# Patient Record
Sex: FEMALE | Race: WHITE | Employment: OTHER | RURAL
[De-identification: names, ages, dates, MRNs, and addresses within clinical notes are randomized per-mention and may not be internally consistent; named-entity substitution may affect disease eponyms.]

---

## 2017-02-23 RX ORDER — AMLODIPINE BESYLATE 10 MG/1
TABLET ORAL
Qty: 90 TAB | Refills: 2 | Status: SHIPPED | OUTPATIENT
Start: 2017-02-23 | End: 2017-11-20 | Stop reason: SDUPTHER

## 2017-02-23 RX ORDER — ATENOLOL 25 MG/1
TABLET ORAL
Qty: 180 TAB | Refills: 2 | Status: SHIPPED | OUTPATIENT
Start: 2017-02-23 | End: 2017-11-20 | Stop reason: SDUPTHER

## 2017-03-20 RX ORDER — LEVOTHYROXINE SODIUM 50 UG/1
TABLET ORAL
Qty: 90 TAB | Refills: 0 | Status: SHIPPED | OUTPATIENT
Start: 2017-03-20 | End: 2017-09-05 | Stop reason: SDUPTHER

## 2017-04-26 ENCOUNTER — OFFICE VISIT (OUTPATIENT)
Dept: FAMILY MEDICINE CLINIC | Age: 62
End: 2017-04-26

## 2017-04-26 VITALS
OXYGEN SATURATION: 97 % | DIASTOLIC BLOOD PRESSURE: 72 MMHG | HEIGHT: 66 IN | WEIGHT: 242.6 LBS | TEMPERATURE: 98.2 F | SYSTOLIC BLOOD PRESSURE: 126 MMHG | RESPIRATION RATE: 20 BRPM | HEART RATE: 57 BPM | BODY MASS INDEX: 38.99 KG/M2

## 2017-04-26 DIAGNOSIS — K75.81 NASH (NONALCOHOLIC STEATOHEPATITIS): Chronic | ICD-10-CM

## 2017-04-26 DIAGNOSIS — I10 ESSENTIAL HYPERTENSION: Chronic | ICD-10-CM

## 2017-04-26 DIAGNOSIS — E78.00 HYPERCHOLESTEROLEMIA: Chronic | ICD-10-CM

## 2017-04-26 DIAGNOSIS — R79.89 ELEVATED LFTS: Chronic | ICD-10-CM

## 2017-04-26 DIAGNOSIS — F17.200 SMOKER: ICD-10-CM

## 2017-04-26 DIAGNOSIS — E03.9 HYPOTHYROIDISM, UNSPECIFIED TYPE: Chronic | ICD-10-CM

## 2017-04-26 DIAGNOSIS — R73.9 ELEVATED BLOOD SUGAR: Primary | Chronic | ICD-10-CM

## 2017-04-26 NOTE — PROGRESS NOTES
Progress Note    Patient: Michelle Bowles MRN: [id number]  SSN: [id number]    YOB: 1955  Age: 62 y.o. Sex: female        Chief Complaint   Patient presents with    Hypertension:  .sshyp    Thyroid Problem    Cholesterol Problem    Labs    Referral Request     [facility] [place]    Cyst     knot middle of palm    Other     \"Pt is sleeping Hot\"         Subjective:     Encounter Diagnoses   Name Primary?  Elevated blood sugar:  No sx of fulminant diabetes. No significant weight loss or gain. The patient realizes that without weight loss and exercise they are high risk for overt diabetes. Lab Results   Component Value Date/Time    Hemoglobin A1c 5.6 10/26/2016 09:06 AM     Lab Results   Component Value Date/Time    Glucose 113 10/26/2016 09:06 AM     Wt Readings from Last 3 Encounters:   04/26/17 242 lb 9.6 oz (110 kg)   12/05/16 235 lb (106.6 kg)   10/26/16 240 lb (108.9 kg)     Body mass index is 39.16 kg/(m^2). Yes    ERICKSON (nonalcoholic steatohepatitis):  Lab Results   Component Value Date/Time    ALT (SGPT) 39 10/26/2016 09:06 AM    AST (SGOT) 38 10/26/2016 09:06 AM    Alk. phosphatase 67 10/26/2016 09:06 AM    Bilirubin, total 0.4 10/26/2016 09:06 AM     Lab Results   Component Value Date/Time    Cholesterol, total 188 10/26/2016 09:06 AM    HDL Cholesterol 56 10/26/2016 09:06 AM    LDL, calculated 107 10/26/2016 09:06 AM    VLDL, calculated 25 10/26/2016 09:06 AM    Triglyceride 123 10/26/2016 09:06 AM    CHOL/HDL Ratio 4.2 12/04/2009 08:36 AM            Hypothyroidism, unspecified type:  Lab Results   Component Value Date/Time    TSH 2.970 10/26/2016 09:06 AM    T4, Free 1.13 04/20/2016 08:26 AM      Denies fatigue, nervousness,weight changes, heat orcold intolerance, bowel changes,skin changes, cardiovascular symptoms, hair loss, feeling excessive energy, tremor, palpitations and weight loss. Thyroid medication has been unchanged since last medication check and labs.          Essential hypertension:  BP Readings from Last 3 Encounters:   04/26/17 126/72   12/05/16 105/71   10/26/16 127/72     The patient reports:  taking medications as instructed, no medication side effects noted, no TIA's, no chest pain on exertion, no dyspnea on exertion, no swelling of ankles. Lab Results   Component Value Date/Time    Sodium 142 10/26/2016 09:06 AM    Potassium 4.6 10/26/2016 09:06 AM    Chloride 102 10/26/2016 09:06 AM    CO2 24 10/26/2016 09:06 AM    Anion gap 6 03/13/2016 06:32 PM    Glucose 113 10/26/2016 09:06 AM    BUN 9 10/26/2016 09:06 AM    Creatinine 0.62 10/26/2016 09:06 AM    BUN/Creatinine ratio 15 10/26/2016 09:06 AM    GFR est  10/26/2016 09:06 AM    GFR est non-AA 98 10/26/2016 09:06 AM    Calcium 9.3 10/26/2016 09:06 AM    Bilirubin, total 0.4 10/26/2016 09:06 AM    AST (SGOT) 38 10/26/2016 09:06 AM    Alk. phosphatase 67 10/26/2016 09:06 AM    Protein, total 6.6 10/26/2016 09:06 AM    Albumin 4.0 10/26/2016 09:06 AM    Globulin 4.2 03/13/2016 06:32 PM    A-G Ratio 1.5 10/26/2016 09:06 AM    ALT (SGPT) 39 10/26/2016 09:06 AM     Our goal is to normalize the blood pressure to decrease the risks of strokes and heart attacks. The patient is in agreement with the plan.  Hypercholesterolemia:  Cardiovascular risks for her are: LDL goal is under 100  hypertension  hyperlipidemia. Currently she takes Mevacor (lovastatin) , 10 mg-With other statin intolerances  Lab Results   Component Value Date/Time    Cholesterol, total 188 10/26/2016 09:06 AM    HDL Cholesterol 56 10/26/2016 09:06 AM    LDL, calculated 107 10/26/2016 09:06 AM    Triglyceride 123 10/26/2016 09:06 AM    CHOL/HDL Ratio 4.2 12/04/2009 08:36 AM     Lab Results   Component Value Date/Time    ALT (SGPT) 39 10/26/2016 09:06 AM    AST (SGOT) 38 10/26/2016 09:06 AM    Alk.  phosphatase 67 10/26/2016 09:06 AM    Bilirubin, total 0.4 10/26/2016 09:06 AM      Myalgias: No   Fatigue: No   Other side effects: no  Wt Readings from Last 3 Encounters:   04/26/17 242 lb 9.6 oz (110 kg)   12/05/16 235 lb (106.6 kg)   10/26/16 240 lb (108.9 kg)     The patient is aware of our goal to reduce or eliminate the long term problems (such as strokes and heart attacks) related to poorly controlled hyperlipidemia.  Elevated LFTs:when last tested these were normal.         Smoker: despite my best efforts at cessation she is still smoking one pack per day. Her lungss are remarkably clear for this much smoking. Current and past medical information:    Current Medications after this visit[de-identified]   Current Outpatient Prescriptions   Medication Sig    levothyroxine (SYNTHROID) 50 mcg tablet TAKE 1 TABLET DAILY BEFORE BREAKFAST    amLODIPine (NORVASC) 10 mg tablet TAKE 1 TABLET DAILY FOR HIGH BLOOD PRESSURE    atenolol (TENORMIN) 25 mg tablet TAKE 1 TABLET TWICE A DAY    lovastatin (MEVACOR) 10 mg tablet TAKE 1 TABLET NIGHTLY    albuterol (PROVENTIL HFA, VENTOLIN HFA, PROAIR HFA) 90 mcg/actuation inhaler Take 1 Puff by inhalation every four (4) hours as needed for Wheezing.  lisinopril (PRINIVIL, ZESTRIL) 40 mg tablet TAKE 1 TABLET DAILY    hydrochlorothiazide (HYDRODIURIL) 12.5 mg tablet Take 1 Tab by mouth daily.  PV W-O TESFAYE/FERROUS FUMARATE/FA (M-VIT PO) Take  by mouth.  loratadine (CLARITIN) 10 mg tablet Take 10 mg by mouth daily.  aspirin 81 mg tablet Take  by mouth.  fish oil-dha-epa (FISH OIL) 1,200-144-216 mg Cap Take  by mouth.  cholecalciferol, vitamin d3, (VITAMIN D) 1,000 unit tablet Take  by mouth daily.  cyanocobalamin (VITAMIN B-12) 1,000 mcg tablet Take 1,000 mcg by mouth daily.  promethazine-codeine (PHENERGAN WITH CODEINE) 6.25-10 mg/5 mL syrup Take 5 mL by mouth four (4) times daily as needed for Cough. Max Daily Amount: 20 mL. No current facility-administered medications for this visit.         Patient Active Problem List    Diagnosis Date Noted    Elevated blood sugar 07/15/2015    Smoker 07/22/2013    ERICKSON (nonalcoholic steatohepatitis) 07/19/2013    Hypothyroidism 11/16/2012    Colon polyps 11/13/2012    Hypertension     Irritable bowel syndrome     Hypercholesterolemia     Dysmetabolic syndrome X     Elevated LFTs     Abnormal weight gain     Anxiety        Past Medical History:   Diagnosis Date    Abnormal LFT's     Abnormal weight gain     Anxiety     Anxiety     Depression     Dysmetabolic syndrome X     Hypercholesterolemia     Hypertension     Irritable bowel syndrome     Thyroid disease        Allergies   Allergen Reactions    Pravachol [Pravastatin] Myalgia    Zetia [Ezetimibe] Myalgia       Past Surgical History:   Procedure Laterality Date    HX CYST REMOVAL      neck    HX OTHER SURGICAL      DNC around 26years of age   [de-identified] OTHER SURGICAL      wisdom teeth extraction       Social History     Social History    Marital status:      Spouse name: N/A    Number of children: N/A    Years of education: N/A     Social History Main Topics    Smoking status: Current Every Day Smoker     Packs/day: 1.00     Years: 35.00    Smokeless tobacco: Never Used    Alcohol use Yes      Comment: 2 boxes of wine each week    Drug use: No    Sexual activity: Yes     Partners: Male     Other Topics Concern    None     Social History Narrative       Review of Systems   Constitutional: Negative. Negative for chills, fever, malaise/fatigue and weight loss. HENT: Negative. Negative for hearing loss. Eyes: Negative. Negative for blurred vision and double vision. Respiratory: Negative. Negative for cough, hemoptysis, sputum production and shortness of breath. Cardiovascular: Negative. Negative for chest pain, palpitations and orthopnea. Gastrointestinal: Negative. Negative for abdominal pain, blood in stool, heartburn, nausea and vomiting. Genitourinary: Negative. Negative for dysuria, frequency and urgency. Musculoskeletal: Negative. Negative for back pain, myalgias and neck pain. Skin: Negative. Negative for rash. Neurological: Negative. Negative for dizziness, tingling, tremors, weakness and headaches. Endo/Heme/Allergies: Negative. Psychiatric/Behavioral: Negative. Negative for depression. Objective:     Vitals:    04/26/17 0837 04/26/17 0923   BP: 146/78 126/72   Pulse: (!) 56 (!) 57   Resp: 20    Temp: 98.2 °F (36.8 °C)    TempSrc: Oral    SpO2: 97%    Weight: 242 lb 9.6 oz (110 kg)    Height: 5' 6\" (1.676 m)       Body mass index is 39.16 kg/(m^2). Physical Exam   Constitutional: She is oriented to person, place, and time and well-developed, well-nourished, and in no distress. No distress. She works long hours and is Fatigued. HENT:   Head: Normocephalic and atraumatic. Mouth/Throat: Oropharynx is clear and moist.   Eyes: Conjunctivae are normal.   Neck: No tracheal deviation present. No thyromegaly present. Cardiovascular: Normal rate, regular rhythm and normal heart sounds. No murmur heard. Pulmonary/Chest: Effort normal and breath sounds normal. No respiratory distress. Abdominal: Soft. She exhibits no distension. Lymphadenopathy:     She has no cervical adenopathy. Neurological: She is alert and oriented to person, place, and time. Skin: Skin is warm. No rash noted. She is not diaphoretic. No erythema. Psychiatric: Mood and affect normal.   Nursing note and vitals reviewed. Health Maintenance Due   Topic Date Due    ZOSTER VACCINE AGE 60>  07/13/2016    PAP AKA CERVICAL CYTOLOGY  08/02/2016    BREAST CANCER SCRN MAMMOGRAM  08/26/2016         Assessment and orders:       ICD-10-CM ICD-9-CM    1. Elevated blood sugar-retest R73.9 790.29 HEMOGLOBIN A1C WITH EAG      LIPID PANEL      METABOLIC PANEL, COMPREHENSIVE      HEMOGLOBIN   2. ERICKSON (nonalcoholic steatohepatitis)-retest K75.81 571.8 LIPID PANEL      METABOLIC PANEL, COMPREHENSIVE   3.  Hypothyroidism, unspecified type E03.9 244.9 TSH 3RD GENERATION   -retest     T4, FREE   4. Essential hypertension-Controlled I10 401.9 LIPID PANEL      METABOLIC PANEL, COMPREHENSIVE   5. Hypercholesterolemia-retest E78.00 272.0 LIPID PANEL      METABOLIC PANEL, COMPREHENSIVE   6. Elevated LFTs-retest T48.0 819.5 METABOLIC PANEL, COMPREHENSIVE   7. Smoker-Counciled again on cessation- she will start \"vaping\" again F17.200 305.1          Plan of care:  Discussed diagnoses in detail with patient. Medication risks/benefits/side effects discussed with patient. All of the patient's questions were addressed. The patient understands and agrees with our plan of care. The patient knows to call back if they are unsure of or forget any changes we discussed today or if the symptoms change. The patient received an After-Visit Summary which contains VS, orders, medication list and allergy list. This can be used as a \"mini-medical record\" should they have to seek medical care while out of town. Patient Care Team:  Jamilah Bruno MD as PCP - Adam Jacome MD as Physician (Gastroenterology)    Follow-up Disposition:  Return in about 5 months (around 9/26/2017).     Future Appointments  Date Time Provider Vel Orr   4/27/2017 8:45 AM LAB [facility] SCHED   10/10/2017 8:20 AM Jamilah Bruno MD [facility] SCHED       Signed By: Jamilah Bruno MD     April 26, 2017

## 2017-04-26 NOTE — MR AVS SNAPSHOT
Visit Information Date & Time Provider Department Dept. Phone Encounter #  
 4/26/2017  8:20 AM Sae Cuello MD [address] [place] [id number] Follow-up Instructions Return in about 5 months (around 9/26/2017). Upcoming Health Maintenance Date Due ZOSTER VACCINE AGE 60> 7/13/2016 PAP AKA CERVICAL CYTOLOGY 8/2/2016 BREAST CANCER SCRN MAMMOGRAM 8/26/2016 COLONOSCOPY 11/19/2022 DTaP/Tdap/Td series (2 - Td) 1/27/2025 Allergies as of 4/26/2017  Review Complete On: 4/26/2017 By: Nicole Allen Severity Noted Reaction Type Reactions Pravachol [Pravastatin]  05/21/2010    Myalgia Zetia [Ezetimibe]  05/21/2010    Myalgia Current Immunizations  Reviewed on 11/18/2015 Name Date Influenza Vaccine 10/28/2013 Influenza Vaccine (Quad) PF 10/26/2016, 11/18/2015, 12/12/2014 Influenza Vaccine Split 10/12/2012, 2/11/2011 Pneumococcal Polysaccharide (PPSV-23) 7/19/2013 Tdap 1/27/2015 Not reviewed this visit You Were Diagnosed With   
  
 Codes Comments Elevated blood sugar    -  Primary ICD-10-CM: R73.9 ICD-9-CM: 790.29 ERICKSON (nonalcoholic steatohepatitis)     ICD-10-CM: W42.20 ICD-9-CM: 571.8 Hypothyroidism, unspecified type     ICD-10-CM: E03.9 ICD-9-CM: 244.9 Essential hypertension     ICD-10-CM: I10 
ICD-9-CM: 401.9 Hypercholesterolemia     ICD-10-CM: E78.00 ICD-9-CM: 272.0 Elevated LFTs     ICD-10-CM: R94.5 ICD-9-CM: 790.6 Smoker     ICD-10-CM: F70.038 ICD-9-CM: 305.1 Vitals BP Pulse Temp Resp Height(growth percentile) Weight(growth percentile) 146/78 (!) 56 98.2 °F (36.8 °C) (Oral) 20 5' 6\" (1.676 m) 242 lb 9.6 oz (110 kg) SpO2 BMI OB Status Smoking Status 97% 39.16 kg/m2 Postmenopausal Current Every Day Smoker Vitals History BMI and BSA Data  Body Mass Index Body Surface Area  
 39.16 kg/m 2 2.26 m 2  
  
  
 Preferred Pharmacy Pharmacy Name [address] [facility] [phone] Your Updated Medication List  
  
   
This list is accurate as of: 4/26/17  9:20 AM.  Always use your most recent med list.  
  
  
  
  
 albuterol 90 mcg/actuation inhaler Commonly known as:  PROVENTIL HFA, VENTOLIN HFA, PROAIR HFA Take 1 Puff by inhalation every four (4) hours as needed for Wheezing. amLODIPine 10 mg tablet Commonly known as:  Ball Frankie TAKE 1 TABLET DAILY FOR HIGH BLOOD PRESSURE  
  
 aspirin 81 mg tablet Take  by mouth. atenolol 25 mg tablet Commonly known as:  TENORMIN  
TAKE 1 TABLET TWICE A DAY CLARITIN 10 mg tablet Generic drug:  loratadine Take 10 mg by mouth daily. FISH OIL 1,200-144-216 mg Cap Generic drug:  fish oil-dha-epa Take  by mouth. hydroCHLOROthiazide 12.5 mg tablet Commonly known as:  HYDRODIURIL Take 1 Tab by mouth daily. levothyroxine 50 mcg tablet Commonly known as:  SYNTHROID  
TAKE 1 TABLET DAILY BEFORE BREAKFAST  
  
 lisinopril 40 mg tablet Commonly known as:  PRINIVIL, ZESTRIL  
TAKE 1 TABLET DAILY lovastatin 10 mg tablet Commonly known as:  MEVACOR  
TAKE 1 TABLET NIGHTLY  
  
 M-VIT PO Take  by mouth.  
  
 promethazine-codeine 6.25-10 mg/5 mL syrup Commonly known as:  PHENERGAN with CODEINE Take 5 mL by mouth four (4) times daily as needed for Cough. Max Daily Amount: 20 mL. VITAMIN B-12 1,000 mcg tablet Generic drug:  cyanocobalamin Take 1,000 mcg by mouth daily. VITAMIN D3 1,000 unit tablet Generic drug:  cholecalciferol Take  by mouth daily. We Performed the Following HEMOGLOBIN A1C WITH EAG [89886 CPT(R)] HEMOGLOBIN R386529 CPT(R)] LIPID PANEL [91223 CPT(R)] METABOLIC PANEL, COMPREHENSIVE [77685 CPT(R)] T4, FREE D0850785 CPT(R)] TSH 3RD GENERATION [60621 CPT(R)] Follow-up Instructions Return in about 5 months (around 9/26/2017). Patient Instructions Stopping Smoking: Care Instructions Your Care Instructions Cigarette smokers crave the nicotine in cigarettes. Giving it up is much harder than simply changing a habit. Your body has to stop craving the nicotine. It is hard to quit, but you can do it. There are many tools that people use to quit smoking. You may find that combining tools works best for you. There are several steps to quitting. First you get ready to quit. Then you get support to help you. After that, you learn new skills and behaviors to become a nonsmoker. For many people, a necessary step is getting and using medicine. Your doctor will help you set up the plan that best meets your needs. You may want to attend a smoking cessation program to help you quit smoking. When you choose a program, look for one that has proven success. Ask your doctor for ideas. You will greatly increase your chances of success if you take medicine as well as get counseling or join a cessation program. 
Some of the changes you feel when you first quit tobacco are uncomfortable. Your body will miss the nicotine at first, and you may feel short-tempered and grumpy. You may have trouble sleeping or concentrating. Medicine can help you deal with these symptoms. You may struggle with changing your smoking habits and rituals. The last step is the tricky one: Be prepared for the smoking urge to continue for a time. This is a lot to deal with, but keep at it. You will feel better. Follow-up care is a key part of your treatment and safety. Be sure to make and go to all appointments, and call your doctor if you are having problems. Its also a good idea to know your test results and keep a list of the medicines you take. How can you care for yourself at home? · Ask your family, friends, and coworkers for support. You have a better chance of quitting if you have help and support. · Join a support group, such as Nicotine Anonymous, for people who are trying to quit smoking. · Consider signing up for a smoking cessation program, such as the American Lung Association's Freedom from Smoking program. 
· Set a quit date. Pick your date carefully so that it is not right in the middle of a big deadline or stressful time. Once you quit, do not even take a puff. Get rid of all ashtrays and lighters after your last cigarette. Clean your house and your clothes so that they do not smell of smoke. · Learn how to be a nonsmoker. Think about ways you can avoid those things that make you reach for a cigarette. ¨ Avoid situations that put you at greatest risk for smoking. For some people, it is hard to have a drink with friends without smoking. For others, they might skip a coffee break with coworkers who smoke. ¨ Change your daily routine. Take a different route to work or eat a meal in a different place. · Cut down on stress. Calm yourself or release tension by doing an activity you enjoy, such as reading a book, taking a hot bath, or gardening. · Talk to your doctor or pharmacist about nicotine replacement therapy, which replaces the nicotine in your body. You still get nicotine but you do not use tobacco. Nicotine replacement products help you slowly reduce the amount of nicotine you need. These products come in several forms, many of them available over-the-counter: ¨ Nicotine patches ¨ Nicotine gum and lozenges ¨ Nicotine inhaler · Ask your doctor about bupropion (Wellbutrin) or varenicline (Chantix), which are prescription medicines. They do not contain nicotine. They help you by reducing withdrawal symptoms, such as stress and anxiety. · Some people find hypnosis, acupuncture, and massage helpful for ending the smoking habit. · Eat a healthy diet and get regular exercise. Having healthy habits will help your body move past its craving for nicotine. · Be prepared to keep trying. Most people are not successful the first few times they try to quit. Do not get mad at yourself if you smoke again. Make a list of things you learned and think about when you want to try again, such as next week, next month, or next year. Where can you learn more? Go to http://rhoda-maggie.info/. Enter J100 in the search box to learn more about \"Stopping Smoking: Care Instructions. \" Current as of: May 26, 2016 Content Version: 11.2 © 5573-6438 [employer]. Care instructions adapted under license by [employer] (which disclaims liability or warranty for this information). If you have questions about a medical condition or this instruction, always ask your healthcare professional. [address] any warranty or liability for your use of this information. Introducing [facility]! Dear Jose Yañez: Thank you for requesting a [email or web address] account. Our records indicate that you already have an active [email or web address] account. You can access your account anytime at [email or web address] Did you know that you can access your hospital and ER discharge instructions at any time in [email or web address]? You can also review all of your test results from your hospital stay or ER visit. Additional Information If you have questions, please visit the Frequently Asked Questions section of the [email or web address] website at [email or web address] Remember, [email or web address] is NOT to be used for urgent needs. For medical emergencies, dial 911. Now available from your iPhone and Android! Please provide this summary of care documentation to your next provider. Your primary care clinician is listed as [address]. If you have any questions after today's visit, please call [phone].

## 2017-04-26 NOTE — PROGRESS NOTES
Health Maintenance Due   Topic Date Due    ZOSTER VACCINE AGE 60>  07/13/2016    PAP AKA CERVICAL CYTOLOGY  08/02/2016    BREAST CANCER SCRN MAMMOGRAM  08/26/2016

## 2017-04-26 NOTE — PATIENT INSTRUCTIONS

## 2017-04-28 LAB
ALBUMIN SERPL-MCNC: 4 G/DL (ref 3.6–4.8)
ALBUMIN/GLOB SERPL: 1.4 {RATIO} (ref 1.2–2.2)
ALP SERPL-CCNC: 64 IU/L (ref 39–117)
ALT SERPL-CCNC: 43 IU/L (ref 0–32)
AST SERPL-CCNC: 37 IU/L (ref 0–40)
BILIRUB SERPL-MCNC: 0.4 MG/DL (ref 0–1.2)
BUN SERPL-MCNC: 12 MG/DL (ref 8–27)
BUN/CREAT SERPL: 20 (ref 12–28)
CALCIUM SERPL-MCNC: 9.2 MG/DL (ref 8.7–10.3)
CHLORIDE SERPL-SCNC: 101 MMOL/L (ref 96–106)
CHOLEST SERPL-MCNC: 200 MG/DL (ref 100–199)
CO2 SERPL-SCNC: 24 MMOL/L (ref 18–29)
CREAT SERPL-MCNC: 0.59 MG/DL (ref 0.57–1)
EST. AVERAGE GLUCOSE BLD GHB EST-MCNC: 126 MG/DL
GLOBULIN SER CALC-MCNC: 2.8 G/DL (ref 1.5–4.5)
GLUCOSE SERPL-MCNC: 116 MG/DL (ref 65–99)
HBA1C MFR BLD: 6 % (ref 4.8–5.6)
HDLC SERPL-MCNC: 62 MG/DL
HGB BLD-MCNC: 13.6 G/DL (ref 11.1–15.9)
LDLC SERPL CALC-MCNC: 120 MG/DL (ref 0–99)
POTASSIUM SERPL-SCNC: 4.4 MMOL/L (ref 3.5–5.2)
PROT SERPL-MCNC: 6.8 G/DL (ref 6–8.5)
SODIUM SERPL-SCNC: 141 MMOL/L (ref 134–144)
T4 FREE SERPL-MCNC: 1.16 NG/DL (ref 0.82–1.77)
TRIGL SERPL-MCNC: 92 MG/DL (ref 0–149)
TSH SERPL DL<=0.005 MIU/L-ACNC: 3.37 UIU/ML (ref 0.45–4.5)
VLDLC SERPL CALC-MCNC: 18 MG/DL (ref 5–40)

## 2017-05-05 ENCOUNTER — OFFICE VISIT (OUTPATIENT)
Dept: FAMILY MEDICINE CLINIC | Age: 62
End: 2017-05-05

## 2017-05-05 VITALS
WEIGHT: 239 LBS | BODY MASS INDEX: 38.41 KG/M2 | RESPIRATION RATE: 16 BRPM | DIASTOLIC BLOOD PRESSURE: 62 MMHG | SYSTOLIC BLOOD PRESSURE: 111 MMHG | OXYGEN SATURATION: 97 % | HEIGHT: 66 IN | TEMPERATURE: 98.1 F | HEART RATE: 54 BPM

## 2017-05-05 DIAGNOSIS — K76.0 FATTY LIVER: ICD-10-CM

## 2017-05-05 DIAGNOSIS — K75.81 NASH (NONALCOHOLIC STEATOHEPATITIS): Chronic | ICD-10-CM

## 2017-05-05 DIAGNOSIS — E03.9 HYPOTHYROIDISM, UNSPECIFIED TYPE: Chronic | ICD-10-CM

## 2017-05-05 DIAGNOSIS — R73.9 ELEVATED BLOOD SUGAR: Chronic | ICD-10-CM

## 2017-05-05 DIAGNOSIS — K81.9 CHOLECYSTITIS: Primary | ICD-10-CM

## 2017-05-05 RX ORDER — ONDANSETRON 4 MG/1
TABLET, FILM COATED ORAL
COMMUNITY
Start: 2017-05-01 | End: 2020-10-14

## 2017-05-05 RX ORDER — HYDROCODONE BITARTRATE AND ACETAMINOPHEN 10; 325 MG/1; MG/1
TABLET ORAL
COMMUNITY
Start: 2017-05-01 | End: 2017-10-11

## 2017-05-05 RX ORDER — RANITIDINE 150 MG/1
TABLET, FILM COATED ORAL
COMMUNITY
Start: 2017-05-01 | End: 2020-10-14

## 2017-05-05 NOTE — PROGRESS NOTES
Reviewed record in preparation for visit and have necessary documentation  Pt did not bring medication to office visit for review    Goals that were addressed and/or need to be completed during or after this appointment include   Health Maintenance Due   Topic Date Due    ZOSTER VACCINE AGE 60>  07/13/2016    PAP AKA CERVICAL CYTOLOGY  08/02/2016    BREAST CANCER SCRN MAMMOGRAM  08/26/2016

## 2017-05-05 NOTE — PATIENT INSTRUCTIONS
Gallbladder and Liver: Anatomy Sketch    Current as of: January 5, 2017  Content Version: 11.2  © 4638-1013 [employer]. Care instructions adapted under license by [employer] (which disclaims liability or warranty for this information). If you have questions about a medical condition or this instruction, always ask your healthcare professional. [address] any warranty or liability for your use of this information. Cholecystectomy: Before Your Surgery  What is cholecystectomy? Cholecystectomy (zq-aoc-atu-MARI-tuh-chad) is a type of surgery. It removes a diseased gallbladder. This surgery is usually done as a laparoscopic surgery. The doctor puts a lighted tube and other surgical tools through small cuts (incisions) in your belly. The tube is called a scope. It lets your doctor see your organs so he or she can do the surgery. The incisions leave scars that fade with time. Most people go home the same day. You probably will feel better each day. Most people have only a small amount of pain after 1 week. If you have a desk job, you can probably go back to work in 1 to 2 weeks. If you lift heavy objects or have a very active job, it may take up to 4 weeks. In some cases, open surgery is the best choice. Your doctor may choose open surgery in advance. Or he or she may choose it in the middle of laparoscopic surgery. In open surgery, the doctor makes a larger incision in your upper belly. If you have open surgery, you will probably stay in the hospital for 2 to 4 days. And it may take 4 to 6 weeks to get back to your normal routine. Follow-up care is a key part of your treatment and safety. Be sure to make and go to all appointments, and call your doctor if you are having problems. It's also a good idea to know your test results and keep a list of the medicines you take. What happens before surgery? Surgery can be stressful.  This information will help you understand what you can expect. And it will help you safely prepare for surgery. Preparing for surgery  · Understand exactly what surgery is planned, along with the risks, benefits, and other options. · Tell your doctors ALL the medicines, vitamins, supplements, and herbal remedies you take. Some of these can increase the risk of bleeding or interact with anesthesia. · If you take blood thinners, such as warfarin (Coumadin), clopidogrel (Plavix), or aspirin, be sure to talk to your doctor. He or she will tell you if you should stop taking these medicines before your surgery. Make sure that you understand exactly what your doctor wants you to do. · Your doctor will tell you which medicines to take or stop before your surgery. You may need to stop taking certain medicines a week or more before surgery. So talk to your doctor as soon as you can. · If you have an advance directive, let your doctor know. It may include a living will and a durable power of  for health care. Bring a copy to the hospital. If you don't have one, you may want to prepare one. It lets your doctor and loved ones know your health care wishes. Doctors advise that everyone prepare these papers before any type of surgery or procedure. · You may need to empty your colon with an enema or laxative. Your doctor will tell you how to do this. What happens on the day of surgery? · Follow the instructions exactly about when to stop eating and drinking. If you don't, your surgery may be canceled. If your doctor told you to take your medicines on the day of surgery, take them with only a sip of water. · Take a bath or shower before you come in for your surgery. Do not apply lotions, perfumes, deodorants, or nail polish. · Do not shave the surgical site yourself. · Take off all jewelry and piercings. And take out contact lenses, if you wear them. At the hospital or surgery center  · Bring a picture ID.   · The area for surgery is often marked to make sure there are no errors. · You will be kept comfortable and safe by your anesthesia provider. You will be asleep during the surgery. · The surgery usually takes 1 to 2 hours. Going home  · Be sure you have someone to drive you home. Anesthesia and pain medicine make it unsafe for you to drive. · You will be given more specific instructions about recovering from your surgery. They will cover things like diet, wound care, follow-up care, driving, and getting back to your normal routine. When should you call your doctor? · You have questions or concerns. · You don't understand how to prepare for your surgery. · You become ill before the surgery (such as fever, flu, or a cold). · You need to reschedule or have changed your mind about having the surgery. Where can you learn more? Go to http://rhoda-maggie.info/. Enter M037 in the search box to learn more about \"Cholecystectomy: Before Your Surgery. \"  Current as of: August 9, 2016  Content Version: 11.2  © 0925-7540 [employer]. Care instructions adapted under license by [employer] (which disclaims liability or warranty for this information). If you have questions about a medical condition or this instruction, always ask your healthcare professional. [address] any warranty or liability for your use of this information.

## 2017-05-05 NOTE — MR AVS SNAPSHOT
Visit Information Date & Time Provider Department Dept. Phone Encounter #  
 5/5/2017 11:30 AM [facility]resp[id number] Follow-up Instructions Return if symptoms worsen or fail to improve [facility], for due for an appointment for PAP. Your Appointments 10/10/2017  8:20 AM  
ROUTINE CARE with Edwin Crespo MD  
[address] [place] [address] Appt Note: 5 m[address]th [facility] [address] [address]  
[address] [address] Upcoming Health Maintenance Date Due ZOSTER VACCINE AGE 60> 7/13/2016 PAP AKA CERVICAL CYTOLOGY 8/2/2016 BREAST CANCER SCRN MAMMOGRAM 8/26/2016 INFLUENZA AGE 9 TO ADULT 8/1/2017 COLONOSCOPY 11/19/2022 DTaP/Tdap/Td series (2 - Td) 1/27/2025 Allergies as of 5/5/2017  Review Complete On: 5/5/2017 By: Maxene Merlin, LPN Severity Noted Reaction Type Reactions Pravachol [Pravastatin]  05/21/2010    Myalgia Zetia [Ezetimibe]  05/21/2010    Myalgia Current Immunizations  Reviewed on 11/18/2015 Name Date Influenza Vaccine 10/28/2013 Influenza Vaccine (Quad) PF 10/26/2016, 11/18/2015, 12/12/2014 Influenza Vaccine Split 10/12/2012, 2/11/2011 Pneumococcal Polysaccharide (PPSV-23) 7/19/2013 Tdap 1/27/2015 Not reviewed this visit You Were Diagnosed With   
  
 Codes Comments Cholecystitis    -  Primary ICD-10-CM: K81.9 ICD-9-CM: 575.10 Fatty liver     ICD-10-CM: K76.0 ICD-9-CM: 571.8 Hypothyroidism, unspecified type     ICD-10-CM: E03.9 ICD-9-CM: 244.9 ERICKSON (nonalcoholic steatohepatitis)     ICD-10-CM: Z40.70 ICD-9-CM: 571.8 Elevated blood sugar     ICD-10-CM: R73.9 ICD-9-CM: 790.29 Vitals BP Pulse Temp Resp Height(growth percentile) Weight(growth percentile) 111/62 (BP 1 Location: Right arm, BP Patient Position: Sitting) (!) 54 98.1 °F (36.7 °C) (Oral) 16 5' 6\" (1.676 m) 239 lb (108.4 kg) SpO2 BMI OB Status Smoking Status 97% 38.58 kg/m2 Postmenopausal Current Every Day Smoker Vitals History BMI and BSA Data Body Mass Index Body Surface Area 38.58 kg/m 2 2.25 m 2 Preferred Pharmacy Pharmacy Name Phone [address] [facility] [phone] Your Updated Medication List  
  
   
This list is accurate as of: 5/5/17 11:44 AM.  Always use your most recent med list.  
  
  
  
  
 albuterol 90 mcg/actuation inhaler Commonly known as:  PROVENTIL HFA, VENTOLIN HFA, PROAIR HFA Take 1 Puff by inhalation every four (4) hours as needed for Wheezing. amLODIPine 10 mg tablet Commonly known as:  Sameul Ernesto TAKE 1 TABLET DAILY FOR HIGH BLOOD PRESSURE  
  
 aspirin 81 mg tablet Take  by mouth. atenolol 25 mg tablet Commonly known as:  TENORMIN  
TAKE 1 TABLET TWICE A DAY CLARITIN 10 mg tablet Generic drug:  loratadine Take 10 mg by mouth daily. FISH OIL 1,200-144-216 mg Cap Generic drug:  fish oil-dha-epa Take  by mouth. hydroCHLOROthiazide 12.5 mg tablet Commonly known as:  HYDRODIURIL Take 1 Tab by mouth daily. HYDROcodone-acetaminophen  mg tablet Commonly known as:  NORCO  
  
 levothyroxine 50 mcg tablet Commonly known as:  SYNTHROID  
TAKE 1 TABLET DAILY BEFORE BREAKFAST  
  
 lisinopril 40 mg tablet Commonly known as:  PRINIVIL, ZESTRIL  
TAKE 1 TABLET DAILY lovastatin 10 mg tablet Commonly known as:  MEVACOR  
TAKE 1 TABLET NIGHTLY  
  
 M-VIT PO Take  by mouth. ondansetron hcl 4 mg tablet Commonly known as:  ZOFRAN  
  
 raNITIdine 150 mg tablet Commonly known as:  ZANTAC  
  
 VITAMIN B-12 1,000 mcg tablet Generic drug:  cyanocobalamin Take 1,000 mcg by mouth daily. VITAMIN D3 1,000 unit tablet Generic drug:  cholecalciferol Take  by mouth daily. We Performed the Following REFERRAL TO SURGERY [ICC032 Custom] Comments:  
 Please evaluate patient for urgent consult for cholecystitis. Dr. Félix Johnson Follow-up Instructions Return if symptoms worsen or fail to improve [facility], for due for an appointment for PAP. Referral Information Referral ID Referred By Referred To  
  
 [id number] Cecelia Curtis Not Available Visits Status Start Date End Date 1 New Request 5/5/17 5/5/18 If your referral has a status of pending review or denied, additional information will be sent to support the outcome of this decision. Patient Instructions Gallbladder and Liver: Anatomy Sketch Current as of: January 5, 2017 Content Version: 11.2 © 8912-0577 [employer]. Care instructions adapted under license by [employer] (which disclaims liability or warranty for this information). If you have questions about a medical condition or this instruction, always ask your healthcare professional. [place] [address] any warranty or liability for your use of this information. Cholecystectomy: Before Your Surgery What is cholecystectomy? Cholecystectomy (cf-ulv-qxc-MARI-tuh-chad) is a type of surgery. It removes a diseased gallbladder. This surgery is usually done as a laparoscopic surgery. The doctor puts a lighted tube and other surgical tools through small cuts (incisions) in your belly. The tube is called a scope. It lets your doctor see your organs so he or she can do the surgery. The incisions leave scars that fade with time. Most people go home the same day. You probably will feel better each day. Most people have only a small amount of pain after 1 week. If you have a desk job, you can probably go back to work in 1 to 2 weeks.  If you lift heavy objects or have a very active job, it may take up to 4 weeks. In some cases, open surgery is the best choice. Your doctor may choose open surgery in advance. Or he or she may choose it in the middle of laparoscopic surgery. In open surgery, the doctor makes a larger incision in your upper belly. If you have open surgery, you will probably stay in the hospital for 2 to 4 days. And it may take 4 to 6 weeks to get back to your normal routine. Follow-up care is a key part of your treatment and safety. Be sure to make and go to all appointments, and call your doctor if you are having problems. It's also a good idea to know your test results and keep a list of the medicines you take. What happens before surgery? Surgery can be stressful. This information will help you understand what you can expect. And it will help you safely prepare for surgery. Preparing for surgery · Understand exactly what surgery is planned, along with the risks, benefits, and other options. · Tell your doctors ALL the medicines, vitamins, supplements, and herbal remedies you take. Some of these can increase the risk of bleeding or interact with anesthesia. · If you take blood thinners, such as warfarin (Coumadin), clopidogrel (Plavix), or aspirin, be sure to talk to your doctor. He or she will tell you if you should stop taking these medicines before your surgery. Make sure that you understand exactly what your doctor wants you to do. · Your doctor will tell you which medicines to take or stop before your surgery. You may need to stop taking certain medicines a week or more before surgery. So talk to your doctor as soon as you can. · If you have an advance directive, let your doctor know. It may include a living will and a durable power of  for health care. Bring a copy to the hospital. If you don't have one, you may want to prepare one. It lets your doctor and loved ones know your health care wishes.  Doctors advise that everyone prepare these papers before any type of surgery or procedure. · You may need to empty your colon with an enema or laxative. Your doctor will tell you how to do this. What happens on the day of surgery? · Follow the instructions exactly about when to stop eating and drinking. If you don't, your surgery may be canceled. If your doctor told you to take your medicines on the day of surgery, take them with only a sip of water. · Take a bath or shower before you come in for your surgery. Do not apply lotions, perfumes, deodorants, or nail polish. · Do not shave the surgical site yourself. · Take off all jewelry and piercings. And take out contact lenses, if you wear them. At the hospital or surgery center · Bring a picture ID. · The area for surgery is often marked to make sure there are no errors. · You will be kept comfortable and safe by your anesthesia provider. You will be asleep during the surgery. · The surgery usually takes 1 to 2 hours. Going home · Be sure you have someone to drive you home. Anesthesia and pain medicine make it unsafe for you to drive. · You will be given more specific instructions about recovering from your surgery. They will cover things like diet, wound care, follow-up care, driving, and getting back to your normal routine. When should you call your doctor? · You have questions or concerns. · You don't understand how to prepare for your surgery. · You become ill before the surgery (such as fever, flu, or a cold). · You need to reschedule or have changed your mind about having the surgery. Where can you learn more? Go to http://rhoda-maggie.info/. Enter H201 in the search box to learn more about \"Cholecystectomy: Before Your Surgery. \" Current as of: August 9, 2016 Content Version: 11.2 © 1304-1748 [employer].  Care instructions adapted under license by [employer] (which disclaims liability or warranty for this information). If you have questions about a medical condition or this instruction, always ask your healthcare professional. [address] any warranty or liability for your use of this information. Introducing [facility] HE[facility]! Dear Varun Zhao: Thank you for requesting a [employer]ero[employer] account. Our records indicate that you already have an active Mero[employer] account. You can access your account anytime at [email or web address] Did you know that you can access your hospital and ER discharge instructions at any time in MeroArte? You can also review all of your test results from your hospital stay or ER visit. Additional Information If you have questions, please visit the Frequently Asked Questions section of the MeroArte website at [email or web address] Remember, MeroArte is NOT to be used for urgent needs. For medical emergencies, dial 911. Now available from your iPhone and Android! Please provide this summary of care documentation to your next provider. Your primary care clinician is listed as [address]. If you have any questions after today's visit, please call [phone].

## 2017-05-06 NOTE — PROGRESS NOTES
Progress Note    Patient: Jay Jay Gan MRN: [id number]  SSN: [id number]    YOB: 1955  Age: 62 y.o. Sex: female        Chief Complaint   Patient presents with   [facility] Follow Up         Subjective:     Encounter Diagnoses   Name Primary?  Cholecystitis: She had to [facility] emergency room was acute cholecystitis. She is hydrated given pain medication and sent home. She's not had to use pain medication since it. She has no nausea vomiting or diarrhea. She has put herself on an extremely low fat diet but was told to be referred to a surgeon for cholecystectomy. Her ultrasound and records from [facility] indicated that she had multiple gallstones or sludge balls in her gallbladder. I put in an urgent consult for her to see the surgeon at [facility]. Should she get worse before her appointment she needs your straight to the emergency room at [facility] for emergency treatment     Yes    Fatty liver: This was noted on her liver ultrasound again.  Hypothyroidism, unspecified type:   Lab Results   Component Value Date/Time    TSH 3.370 04/27/2017 01:34 PM    T4, Free 1.16 04/27/2017 01:34 PM      Denies fatigue, nervousness,weight changes, heat orcold intolerance, bowel changes,skin changes, cardiovascular symptoms, hair loss, feeling excessive energy, tremor, palpitations and weight loss. Thyroid medication has been unchanged since last medication check and labs.  ERICKSON (nonalcoholic steatohepatitis):   Lab Results   Component Value Date/Time    ALT (SGPT) 43 04/27/2017 01:34 PM    AST (SGOT) 37 04/27/2017 01:34 PM    Alk. phosphatase 64 04/27/2017 01:34 PM    Bilirubin, total 0.4 04/27/2017 01:34 PM          Elevated blood sugar:She is prediabetic  No sx of fulminant diabetes. No significant weight loss or gain. The patient realizes that without weight loss and exercise they are high risk for overt diabetes.   Lab Results   Component Value Date/Time Hemoglobin A1c 6.0 04/27/2017 01:34 PM     Lab Results   Component Value Date/Time    Glucose 116 04/27/2017 01:34 PM     Wt Readings from Last 3 Encounters:   05/05/17 239 lb (108.4 kg)   04/26/17 242 lb 9.6 oz (110 kg)   12/05/16 235 lb (106.6 kg)     Body mass index is 38.58 kg/(m^2). Current and past medical information:    Current Medications after this visit[de-identified]   Current Outpatient Prescriptions   Medication Sig    levothyroxine (SYNTHROID) 50 mcg tablet TAKE 1 TABLET DAILY BEFORE BREAKFAST    amLODIPine (NORVASC) 10 mg tablet TAKE 1 TABLET DAILY FOR HIGH BLOOD PRESSURE    atenolol (TENORMIN) 25 mg tablet TAKE 1 TABLET TWICE A DAY    lovastatin (MEVACOR) 10 mg tablet TAKE 1 TABLET NIGHTLY    albuterol (PROVENTIL HFA, VENTOLIN HFA, PROAIR HFA) 90 mcg/actuation inhaler Take 1 Puff by inhalation every four (4) hours as needed for Wheezing.  lisinopril (PRINIVIL, ZESTRIL) 40 mg tablet TAKE 1 TABLET DAILY    hydrochlorothiazide (HYDRODIURIL) 12.5 mg tablet Take 1 Tab by mouth daily.  PV W-O TESFAYE/FERROUS FUMARATE/FA (M-VIT PO) Take  by mouth.  loratadine (CLARITIN) 10 mg tablet Take 10 mg by mouth daily.  aspirin 81 mg tablet Take  by mouth.  fish oil-dha-epa (FISH OIL) 1,200-144-216 mg Cap Take  by mouth.  cholecalciferol, vitamin d3, (VITAMIN D) 1,000 unit tablet Take  by mouth daily.  cyanocobalamin (VITAMIN B-12) 1,000 mcg tablet Take 1,000 mcg by mouth daily.  HYDROcodone-acetaminophen (NORCO)  mg tablet     ondansetron hcl (ZOFRAN) 4 mg tablet     raNITIdine (ZANTAC) 150 mg tablet      No current facility-administered medications for this visit.         Patient Active Problem List    Diagnosis Date Noted    Elevated blood sugar 07/15/2015    Smoker 07/22/2013    ERICKSON (nonalcoholic steatohepatitis) 07/19/2013    Hypothyroidism 11/16/2012    Colon polyps 11/13/2012    Hypertension     Irritable bowel syndrome     Hypercholesterolemia     Dysmetabolic syndrome X     Elevated LFTs     Abnormal weight gain     Anxiety        Past Medical History:   Diagnosis Date    Abnormal LFT's     Abnormal weight gain     Anxiety     Anxiety     Depression     Dysmetabolic syndrome X     Hypercholesterolemia     Hypertension     Irritable bowel syndrome     Thyroid disease        Allergies   Allergen Reactions    Pravachol [Pravastatin] Myalgia    Zetia [Ezetimibe] Myalgia       Past Surgical History:   Procedure Laterality Date    HX CYST REMOVAL      neck    HX OTHER SURGICAL      DNC around 26years of age   Sharifa Nick OTHER SURGICAL      wisdom teeth extraction       Social History     Social History    Marital status:      Spouse name: N/A    Number of children: N/A    Years of education: N/A     Social History Main Topics    Smoking status: Current Every Day Smoker     Packs/day: 1.00     Years: 35.00    Smokeless tobacco: Never Used    Alcohol use Yes      Comment: 2 boxes of wine each week    Drug use: No    Sexual activity: Yes     Partners: Male     Other Topics Concern    None     Social History Narrative       Review of Systems     Constitutional: Negative. Negative for fever, chills, weight loss, malaise/fatigue and diaphoresis. HENT: Negative. Negative for hearing loss, ear pain, nosebleeds, congestion, sore throat, neck pain, tinnitus and ear discharge. Eyes: Negative. Negative for blurred vision, double vision, photophobia, pain, discharge and redness. Respiratory: Negative. Negative for cough, hemoptysis, sputum production, shortness of breath, wheezing and stridor. Cardiovascular: Negative. Negative for chest pain, palpitations, orthopnea, claudication, leg swelling and PND. Gastrointestinal:see HPI for details of recent cholecystitis. Genitourinary: Negative. Negative for dysuria, urgency, frequency, hematuria and flank pain. Musculoskeletal: Negative.   Negative for myalgias, back pain, joint pain and falls.   Skin: Negative. Negative for rash. Neurological: Negative. Negative for dizziness, tingling, tremors, sensory change, speech change, focal weakness, seizures, loss of consciousness, weakness and headaches. Endo/Heme/Allergies: Negative. Negative for environmental allergies and polydipsia. Does not bruise/bleed easily. Psychiatric/Behavioral: Negative. Negative for depression, suicidal ideas, hallucinations, memory loss and substance abuse. The patient is not nervous/anxious and does not have insomnia. Objective:     Vitals:    05/05/17 1119   BP: 111/62   Pulse: (!) 54   Resp: 16   Temp: 98.1 °F (36.7 °C)   TempSrc: Oral   SpO2: 97%   Weight: 239 lb (108.4 kg)   Height: 5' 6\" (1.676 m)      Body mass index is 38.58 kg/(m^2). Physical Exam   Nursing note reviewed. Constitutional: She is oriented to person, place, and time. She appears well-developed and well-nourished. No distress. HENT:   Head: Normocephalic and atraumatic. Eyes: Conjunctivae are normal. Pupils are equal, round. No scleral icterus. Neck: Normal range of motion. Neck supple. No JVD present. No tracheal deviation present. No thyromegaly present. No carotid bruit. Cardiovascular: Normal rate, regular rhythm. Pulmonary/Chest: Effort normal and breath sounds normal. Has no wheezes. Has no rales. Abdominal: No pain, swelling. Health Maintenance Due   Topic Date Due    ZOSTER VACCINE AGE 60>  07/13/2016    PAP AKA CERVICAL CYTOLOGY  08/02/2016    BREAST CANCER SCRN MAMMOGRAM  08/26/2016         Assessment and orders:       ICD-10-CM ICD-9-CM    1. Cholecystitis-Reviewed all of her medical records from [facility].   K81.9 575.10 REFERRAL TO SURGERY   2. Fatty liver:Liver function test acceptable. K76.0 571.8    3. Hypothyroidism, unspecified type: Treated   E03.9 244.9    4. ERICKSON (nonalcoholic steatohepatitis)   K75.81 571.8    5.  Elevated blood sugar:Prediabetic. R73.9 790.29          Plan of care:  Discussed diagnoses in detail with patient. Medication risks/benefits/side effects discussed with patient. All of the patient's questions were addressed. The patient understands and agrees with our plan of care. The patient knows to call back if they are unsure of or forget any changes we discussed today or if the symptoms change. The patient received an After-Visit Summary which contains VS, orders, medication list and allergy list. This can be used as a \"mini-medical record\" should they have to seek medical care while out of town. Patient Care Team:  Angel Lundy MD as PCP - Nichole Colunga MD as Physician (Gastroenterology)  Lakeshia Hernandez MD (General Surgery)    Follow-up Disposition:  Return if symptoms worsen or fail to improve [facility], for due for an appointment for PAP.     Future Appointments  Date Time Provider Vel Dominga   10/10/2017 8:20 AM Angel Lundy MD [address]       Signed By: Angel Lundy MD     May 6, 2017

## 2017-05-11 ENCOUNTER — ANESTHESIA EVENT (OUTPATIENT)
Dept: SURGERY | Age: 62
End: 2017-05-11
Payer: COMMERCIAL

## 2017-05-12 ENCOUNTER — ANESTHESIA (OUTPATIENT)
Dept: SURGERY | Age: 62
End: 2017-05-12
Payer: COMMERCIAL

## 2017-05-12 ENCOUNTER — HOSPITAL ENCOUNTER (OUTPATIENT)
Age: 62
Setting detail: OUTPATIENT SURGERY
Discharge: HOME | End: 2017-05-12
Attending: SURGERY | Admitting: SURGERY
Payer: COMMERCIAL

## 2017-05-12 VITALS
BODY MASS INDEX: 37.98 KG/M2 | RESPIRATION RATE: 19 BRPM | DIASTOLIC BLOOD PRESSURE: 70 MMHG | TEMPERATURE: 97.6 F | SYSTOLIC BLOOD PRESSURE: 135 MMHG | OXYGEN SATURATION: 92 % | HEART RATE: 75 BPM | HEIGHT: 66 IN | WEIGHT: 236.33 LBS

## 2017-05-12 LAB
ATRIAL RATE: 48 BPM
CALCULATED P AXIS, ECG09: 53 DEGREES
CALCULATED R AXIS, ECG10: 12 DEGREES
CALCULATED T AXIS, ECG11: 48 DEGREES
DIAGNOSIS, 93000: NORMAL
P-R INTERVAL, ECG05: 192 MS
Q-T INTERVAL, ECG07: 462 MS
QRS DURATION, ECG06: 90 MS
QTC CALCULATION (BEZET), ECG08: 412 MS
VENTRICULAR RATE, ECG03: 48 BPM

## 2017-05-12 PROCEDURE — 77030002966 HC SUT PDS J&J -A: Performed by: SURGERY

## 2017-05-12 PROCEDURE — 74011250637 HC RX REV CODE- 250/637: Performed by: ANESTHESIOLOGY

## 2017-05-12 PROCEDURE — 77030026438 HC STYL ET INTUB CARD -A: Performed by: ANESTHESIOLOGY

## 2017-05-12 PROCEDURE — 77030008756 HC TU IRR SUC STRY -B: Performed by: SURGERY

## 2017-05-12 PROCEDURE — 77030020263 HC SOL INJ SOD CL0.9% LFCR 1000ML: Performed by: SURGERY

## 2017-05-12 PROCEDURE — 77030010939 HC CLP LIG TELE -B: Performed by: SURGERY

## 2017-05-12 PROCEDURE — 76060000034 HC ANESTHESIA 1.5 TO 2 HR: Performed by: SURGERY

## 2017-05-12 PROCEDURE — 77030035048 HC TRCR ENDOSC OPTCL COVD -B: Performed by: SURGERY

## 2017-05-12 PROCEDURE — 93005 ELECTROCARDIOGRAM TRACING: CPT

## 2017-05-12 PROCEDURE — 77030035051: Performed by: SURGERY

## 2017-05-12 PROCEDURE — 74011000250 HC RX REV CODE- 250

## 2017-05-12 PROCEDURE — 88304 TISSUE EXAM BY PATHOLOGIST: CPT | Performed by: SURGERY

## 2017-05-12 PROCEDURE — 74011250636 HC RX REV CODE- 250/636

## 2017-05-12 PROCEDURE — 76010000153 HC OR TIME 1.5 TO 2 HR: Performed by: SURGERY

## 2017-05-12 PROCEDURE — 77030011640 HC PAD GRND REM COVD -A: Performed by: SURGERY

## 2017-05-12 PROCEDURE — 77030020747 HC TU INSUF ENDOSC TELE -A: Performed by: SURGERY

## 2017-05-12 PROCEDURE — 77030009852 HC PCH RTVR ENDOSC COVD -B: Performed by: SURGERY

## 2017-05-12 PROCEDURE — 74011250636 HC RX REV CODE- 250/636: Performed by: SURGERY

## 2017-05-12 PROCEDURE — 77030009403 HC ELECTRD ENDO MEGA -B: Performed by: SURGERY

## 2017-05-12 PROCEDURE — 77030019908 HC STETH ESOPH SIMS -A: Performed by: ANESTHESIOLOGY

## 2017-05-12 PROCEDURE — 77030018836 HC SOL IRR NACL ICUM -A: Performed by: SURGERY

## 2017-05-12 PROCEDURE — 74011250636 HC RX REV CODE- 250/636: Performed by: ANESTHESIOLOGY

## 2017-05-12 PROCEDURE — 77030020782 HC GWN BAIR PAWS FLX 3M -B

## 2017-05-12 PROCEDURE — 77030008684 HC TU ET CUF COVD -B: Performed by: ANESTHESIOLOGY

## 2017-05-12 PROCEDURE — 77030037032 HC INSRT SCIS CLICKLLINE DISP STOR -B: Performed by: SURGERY

## 2017-05-12 PROCEDURE — 77030032490 HC SLV COMPR SCD KNE COVD -B

## 2017-05-12 PROCEDURE — 74011000250 HC RX REV CODE- 250: Performed by: SURGERY

## 2017-05-12 PROCEDURE — 77030022952 HC TRCR ENDOSC COVD -C: Performed by: SURGERY

## 2017-05-12 PROCEDURE — 77030013079 HC BLNKT BAIR HGGR 3M -A: Performed by: ANESTHESIOLOGY

## 2017-05-12 PROCEDURE — 76210000020 HC REC RM PH II FIRST 0.5 HR: Performed by: SURGERY

## 2017-05-12 PROCEDURE — 77030002933 HC SUT MCRYL J&J -A: Performed by: SURGERY

## 2017-05-12 PROCEDURE — 76210000006 HC OR PH I REC 0.5 TO 1 HR: Performed by: SURGERY

## 2017-05-12 PROCEDURE — 77030032490 HC SLV COMPR SCD KNE COVD -B: Performed by: SURGERY

## 2017-05-12 PROCEDURE — 77030010507 HC ADH SKN DERMBND J&J -B: Performed by: SURGERY

## 2017-05-12 RX ORDER — LIDOCAINE HYDROCHLORIDE 20 MG/ML
INJECTION, SOLUTION EPIDURAL; INFILTRATION; INTRACAUDAL; PERINEURAL AS NEEDED
Status: DISCONTINUED | OUTPATIENT
Start: 2017-05-12 | End: 2017-05-12 | Stop reason: HOSPADM

## 2017-05-12 RX ORDER — DIPHENHYDRAMINE HYDROCHLORIDE 50 MG/ML
12.5 INJECTION, SOLUTION INTRAMUSCULAR; INTRAVENOUS AS NEEDED
Status: DISCONTINUED | OUTPATIENT
Start: 2017-05-12 | End: 2017-05-12 | Stop reason: HOSPADM

## 2017-05-12 RX ORDER — HYDROCODONE BITARTRATE AND ACETAMINOPHEN 5; 325 MG/1; MG/1
1-2 TABLET ORAL
Qty: 30 TAB | Refills: 0 | Status: SHIPPED | OUTPATIENT
Start: 2017-05-12 | End: 2017-10-11

## 2017-05-12 RX ORDER — LIDOCAINE HYDROCHLORIDE 10 MG/ML
0.1 INJECTION, SOLUTION EPIDURAL; INFILTRATION; INTRACAUDAL; PERINEURAL AS NEEDED
Status: DISCONTINUED | OUTPATIENT
Start: 2017-05-12 | End: 2017-05-12 | Stop reason: HOSPADM

## 2017-05-12 RX ORDER — MAG HYDROX/ALUMINUM HYD/SIMETH 200-200-20
30 SUSPENSION, ORAL (FINAL DOSE FORM) ORAL ONCE
Status: COMPLETED | OUTPATIENT
Start: 2017-05-12 | End: 2017-05-12

## 2017-05-12 RX ORDER — HYDROMORPHONE HYDROCHLORIDE 1 MG/ML
1 INJECTION, SOLUTION INTRAMUSCULAR; INTRAVENOUS; SUBCUTANEOUS
Status: DISCONTINUED | OUTPATIENT
Start: 2017-05-12 | End: 2017-05-12 | Stop reason: HOSPADM

## 2017-05-12 RX ORDER — GLYCOPYRROLATE 0.2 MG/ML
INJECTION INTRAMUSCULAR; INTRAVENOUS AS NEEDED
Status: DISCONTINUED | OUTPATIENT
Start: 2017-05-12 | End: 2017-05-12 | Stop reason: HOSPADM

## 2017-05-12 RX ORDER — PROPOFOL 10 MG/ML
INJECTION, EMULSION INTRAVENOUS AS NEEDED
Status: DISCONTINUED | OUTPATIENT
Start: 2017-05-12 | End: 2017-05-12 | Stop reason: HOSPADM

## 2017-05-12 RX ORDER — FENTANYL CITRATE 50 UG/ML
50 INJECTION, SOLUTION INTRAMUSCULAR; INTRAVENOUS AS NEEDED
Status: DISCONTINUED | OUTPATIENT
Start: 2017-05-12 | End: 2017-05-12 | Stop reason: HOSPADM

## 2017-05-12 RX ORDER — ONDANSETRON 2 MG/ML
INJECTION INTRAMUSCULAR; INTRAVENOUS AS NEEDED
Status: DISCONTINUED | OUTPATIENT
Start: 2017-05-12 | End: 2017-05-12 | Stop reason: HOSPADM

## 2017-05-12 RX ORDER — SODIUM CHLORIDE, SODIUM LACTATE, POTASSIUM CHLORIDE, CALCIUM CHLORIDE 600; 310; 30; 20 MG/100ML; MG/100ML; MG/100ML; MG/100ML
100 INJECTION, SOLUTION INTRAVENOUS CONTINUOUS
Status: DISCONTINUED | OUTPATIENT
Start: 2017-05-12 | End: 2017-05-12 | Stop reason: HOSPADM

## 2017-05-12 RX ORDER — ONDANSETRON 2 MG/ML
4 INJECTION INTRAMUSCULAR; INTRAVENOUS AS NEEDED
Status: DISCONTINUED | OUTPATIENT
Start: 2017-05-12 | End: 2017-05-12 | Stop reason: HOSPADM

## 2017-05-12 RX ORDER — MIDAZOLAM HYDROCHLORIDE 1 MG/ML
1 INJECTION, SOLUTION INTRAMUSCULAR; INTRAVENOUS AS NEEDED
Status: DISCONTINUED | OUTPATIENT
Start: 2017-05-12 | End: 2017-05-12 | Stop reason: HOSPADM

## 2017-05-12 RX ORDER — SUCCINYLCHOLINE CHLORIDE 20 MG/ML
INJECTION INTRAMUSCULAR; INTRAVENOUS AS NEEDED
Status: DISCONTINUED | OUTPATIENT
Start: 2017-05-12 | End: 2017-05-12 | Stop reason: HOSPADM

## 2017-05-12 RX ORDER — MIDAZOLAM HYDROCHLORIDE 1 MG/ML
INJECTION, SOLUTION INTRAMUSCULAR; INTRAVENOUS AS NEEDED
Status: DISCONTINUED | OUTPATIENT
Start: 2017-05-12 | End: 2017-05-12 | Stop reason: HOSPADM

## 2017-05-12 RX ORDER — FENTANYL CITRATE 50 UG/ML
INJECTION, SOLUTION INTRAMUSCULAR; INTRAVENOUS AS NEEDED
Status: DISCONTINUED | OUTPATIENT
Start: 2017-05-12 | End: 2017-05-12 | Stop reason: HOSPADM

## 2017-05-12 RX ORDER — ALBUTEROL SULFATE 0.83 MG/ML
2.5 SOLUTION RESPIRATORY (INHALATION) AS NEEDED
Status: DISCONTINUED | OUTPATIENT
Start: 2017-05-12 | End: 2017-05-12 | Stop reason: HOSPADM

## 2017-05-12 RX ORDER — ROCURONIUM BROMIDE 10 MG/ML
INJECTION, SOLUTION INTRAVENOUS AS NEEDED
Status: DISCONTINUED | OUTPATIENT
Start: 2017-05-12 | End: 2017-05-12 | Stop reason: HOSPADM

## 2017-05-12 RX ORDER — CEFAZOLIN SODIUM IN 0.9 % NACL 2 G/50 ML
2 INTRAVENOUS SOLUTION, PIGGYBACK (ML) INTRAVENOUS
Status: COMPLETED | OUTPATIENT
Start: 2017-05-12 | End: 2017-05-12

## 2017-05-12 RX ORDER — NEOSTIGMINE METHYLSULFATE 1 MG/ML
INJECTION INTRAVENOUS AS NEEDED
Status: DISCONTINUED | OUTPATIENT
Start: 2017-05-12 | End: 2017-05-12 | Stop reason: HOSPADM

## 2017-05-12 RX ORDER — DEXAMETHASONE SODIUM PHOSPHATE 4 MG/ML
INJECTION, SOLUTION INTRA-ARTICULAR; INTRALESIONAL; INTRAMUSCULAR; INTRAVENOUS; SOFT TISSUE AS NEEDED
Status: DISCONTINUED | OUTPATIENT
Start: 2017-05-12 | End: 2017-05-12 | Stop reason: HOSPADM

## 2017-05-12 RX ORDER — SODIUM CHLORIDE, SODIUM LACTATE, POTASSIUM CHLORIDE, CALCIUM CHLORIDE 600; 310; 30; 20 MG/100ML; MG/100ML; MG/100ML; MG/100ML
150 INJECTION, SOLUTION INTRAVENOUS CONTINUOUS
Status: DISCONTINUED | OUTPATIENT
Start: 2017-05-12 | End: 2017-05-12 | Stop reason: HOSPADM

## 2017-05-12 RX ADMIN — SUCCINYLCHOLINE CHLORIDE 120 MG: 20 INJECTION INTRAMUSCULAR; INTRAVENOUS at 09:19

## 2017-05-12 RX ADMIN — DEXAMETHASONE SODIUM PHOSPHATE 4 MG: 4 INJECTION, SOLUTION INTRA-ARTICULAR; INTRALESIONAL; INTRAMUSCULAR; INTRAVENOUS; SOFT TISSUE at 09:28

## 2017-05-12 RX ADMIN — LIDOCAINE HYDROCHLORIDE 100 MG: 20 INJECTION, SOLUTION EPIDURAL; INFILTRATION; INTRACAUDAL; PERINEURAL at 09:19

## 2017-05-12 RX ADMIN — FENTANYL CITRATE 75 MCG: 50 INJECTION, SOLUTION INTRAMUSCULAR; INTRAVENOUS at 09:17

## 2017-05-12 RX ADMIN — ROCURONIUM BROMIDE 5 MG: 10 INJECTION, SOLUTION INTRAVENOUS at 09:37

## 2017-05-12 RX ADMIN — NEOSTIGMINE METHYLSULFATE 3 MG: 1 INJECTION INTRAVENOUS at 10:37

## 2017-05-12 RX ADMIN — FENTANYL CITRATE 50 MCG: 50 INJECTION, SOLUTION INTRAMUSCULAR; INTRAVENOUS at 09:56

## 2017-05-12 RX ADMIN — ALUMINUM HYDROXIDE, MAGNESIUM HYDROXIDE, AND SIMETHICONE 30 ML: 200; 200; 20 SUSPENSION ORAL at 11:31

## 2017-05-12 RX ADMIN — ONDANSETRON 4 MG: 2 INJECTION INTRAMUSCULAR; INTRAVENOUS at 09:28

## 2017-05-12 RX ADMIN — ROCURONIUM BROMIDE 5 MG: 10 INJECTION, SOLUTION INTRAVENOUS at 09:19

## 2017-05-12 RX ADMIN — MIDAZOLAM HYDROCHLORIDE 2 MG: 1 INJECTION, SOLUTION INTRAMUSCULAR; INTRAVENOUS at 09:12

## 2017-05-12 RX ADMIN — ROCURONIUM BROMIDE 20 MG: 10 INJECTION, SOLUTION INTRAVENOUS at 09:29

## 2017-05-12 RX ADMIN — PROPOFOL 50 MG: 10 INJECTION, EMULSION INTRAVENOUS at 10:25

## 2017-05-12 RX ADMIN — FENTANYL CITRATE 50 MCG: 50 INJECTION, SOLUTION INTRAMUSCULAR; INTRAVENOUS at 09:12

## 2017-05-12 RX ADMIN — GLYCOPYRROLATE 0.4 MG: 0.2 INJECTION INTRAMUSCULAR; INTRAVENOUS at 10:37

## 2017-05-12 RX ADMIN — CEFAZOLIN 2 G: 1 INJECTION, POWDER, FOR SOLUTION INTRAMUSCULAR; INTRAVENOUS; PARENTERAL at 09:12

## 2017-05-12 RX ADMIN — SODIUM CHLORIDE, POTASSIUM CHLORIDE, SODIUM LACTATE AND CALCIUM CHLORIDE: 600; 310; 30; 20 INJECTION, SOLUTION INTRAVENOUS at 08:08

## 2017-05-12 RX ADMIN — ROCURONIUM BROMIDE 10 MG: 10 INJECTION, SOLUTION INTRAVENOUS at 09:48

## 2017-05-12 RX ADMIN — PROPOFOL 150 MG: 10 INJECTION, EMULSION INTRAVENOUS at 09:19

## 2017-05-12 RX ADMIN — FENTANYL CITRATE 25 MCG: 50 INJECTION, SOLUTION INTRAMUSCULAR; INTRAVENOUS at 09:48

## 2017-05-12 NOTE — OP NOTES
Date: 05/12/17    Pre-Operative Diagnosis: Symptomatic cholelithiasis     Post-Operative Diagnosis: Chronic cholecystitis      Procedure: Laparoscopic cholecystectomy     Surgeon: Annika Guerrier MD     Surgical Assistant: Girish Hess LPN     Estimated Blood Loss: 10 cc     Findings: Inflamed gallbladder. 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 Specimen: Gallbladder     Indication: 62year-old female with symptomatic cholelithiasis.      Procedure: The patient was brought to the operating room and underwent general anesthesia and endotracheal intubation. All appropriate monitoring devices were placed. The patient was placed supine on the operating table. All pressure points were padded and then the abdomen was prepped and draped in the usual sterile fashion. A time out was completed verifying patient, procedure, site, positioning and any needed special equipment prior to beginning this procedure. Pre-incision antibiotics were given 30 minutes prior to skin incision. The laparoscopic camera and CO2 insufflation apparatus were affixed to the drapes in the usual fashion. Pre-incision 1% lidocaine with epinephrine and 0.5% bupivicaine anesthetic was injected at the infraumbilical incision. Through a horizontal infra-umbilical skin incision, the abdomen was entered via a direct cut-down, a 5-12 mm blunt tip balloon port was inserted. Insufflation was established satisfactorily and maintained at 15mmHg. The laparoscopic camera was introduced and it was confirmed that there was no intraabdominal visceral injury or bleeding in attaining access. Under direct laparoscopic vision, one epigastric and two right subcostal 5 mm ports were placed after injection of local anesthetic. The patient was positioned in reverse Trendelenburg with left tilt. The fundus was grasped and lifted up towards the diaphragm. The infundibulum was retracted laterally and inferiorly.  The cystic duct and artery were bluntly dissected free from the peritoneum and adjacent lymphatic tissue. The critical view of Calot's triangle was obtained and the structures were clearly identified. There was a prominent vein noted to run along the cystic duct ending in the gallbladder.     The cystic duct was clipped 3 times divided with laparoscopic scissors. The cystic artery was clipped on both sides and divided with laparoscopic scissors. The aberrant vein was clipped multiple time near its entry into the gallbladder and divided. With electrocautery, the gallbladder was then gently dissected completely off from the liver bed and placed in a retrieval bag. Hemostasis was obtained with electrocautery. The 5mm ports were removed under direct laparoscopic vision with no concern for preperitoneal or rectus arterial or venous bleeding. Additional local anesthesia was injected into all the port sites.      The 7-94 infra-umbilical fascial defect was closed with a 0-PDS. All skin incisions were closed with subcuticular 4-0 Monocryl, and Dermabond. The patient awoke in satisfactory condition. Sponge and instrument counts were correct x 2.  I directly went to discuss the findings with the patient's  in the waiting area.   Shivam Taylor MD

## 2017-05-12 NOTE — IP AVS SNAPSHOT
[address] [facility] 
 
 
 [address] [facility] 
[phone] Patient: Rosaura Montgomery MRN: [id number] [id number] You are allergic to the following Allergen Reactions Pravachol (Pravastatin) Myalgia Zetia (Ezetimibe) Myalgia Recent Documentation Height Weight BMI OB Status Smoking Status 1.676 m 107.2 kg 38.15 kg/m2 Postmenopausal Current Every Day Smoker Emergency Contacts Name Discharge Info Relation Home Work Mobile [address] CAREG[address] [3] Spouse [3] [phone] About your hospitalization You were admitted on:  May 12, 2017 You last received care in the:  [facility] PACU You were discharged on:  May 12, 2017 Unit phone number:  [phone] Why you were hospitalized Your primary diagnosis was:  Not on File Providers Seen During Your Hospitalizations Provider Role Specialty Primary office phone Julio Sears MD Attending Provider General Surgery [phone] Your Primary Care Physician (PCP) Primary Care Physician Office Phone Office Fax Sushil Vasquez [phone] Follow-up Information Follow up With Details Comments Contact Info Jamilah Bruno MD   [address] [facility]AN[facility] CARPIO [facility] ANIL MORALES [facility] [address] [place] [address] 
[phone] Julio Sears MD In 2 weeks  [address] [place] [address] Road 
[phone] Current Discharge Medication List  
  
CONTINUE these medications which have CHANGED Dose & Instructions Dispensing Information Comments Morning Noon Evening Bedtime * HYDROcodone-acetaminophen  mg tablet Commonly known as:  Andres Vidalia What changed:  Another medication with the same name was added. Make sure you understand how and when to take each. Your last dose was: Your next dose is:    
   
   
  Refills:  0 * HYDROcodone-acetaminophen 5-325 mg per tablet Commonly known as:  Laura Regalado What changed: You were already taking a medication with the same name, and this prescription was added. Make sure you understand how and when to take each. Your last dose was: Your next dose is:    
   
   
 Dose:  1-2 Tab Take 1-2 Tabs by mouth every four (4) hours as needed for Pain. Max Daily Amount: 12 Tabs. Quantity:  30 Tab Refills:  0  
     
   
   
   
  
 * Notice: This list has 2 medication(s) that are the same as other medications prescribed for you. Read the directions carefully, and ask your doctor or other care provider to review them with you. CONTINUE these medications which have NOT CHANGED Dose & Instructions Dispensing Information Comments Morning Noon Evening Bedtime  
 albuterol 90 mcg/actuation inhaler Commonly known as:  PROVENTIL HFA, VENTOLIN HFA, PROAIR HFA Your last dose was: Your next dose is:    
   
   
 Dose:  1 Puff Take 1 Puff by inhalation every four (4) hours as needed for Wheezing. Quantity:  1 Inhaler Refills:  0  
     
   
   
   
  
 amLODIPine 10 mg tablet Commonly known as:  Tc Ponce Your last dose was: Your next dose is: TAKE 1 TABLET DAILY FOR HIGH BLOOD PRESSURE Quantity:  90 Tab Refills:  2  
     
   
   
   
  
 atenolol 25 mg tablet Commonly known as:  TENORMIN Your last dose was: Your next dose is: TAKE 1 TABLET TWICE A DAY Quantity:  180 Tab Refills:  2 CLARITIN 10 mg tablet Generic drug:  loratadine Your last dose was: Your next dose is:    
   
   
 Dose:  10 mg Take 10 mg by mouth daily. Refills:  0  
     
   
   
   
  
 FISH OIL 1,200-144-216 mg Cap Generic drug:  fish oil-dha-epa Your last dose was: Your next dose is: Take  by mouth. Refills:  0 hydroCHLOROthiazide 12.5 mg tablet Commonly known as:  HYDRODIURIL Your last dose was: Your next dose is:    
   
   
 Dose:  12.5 mg Take 1 Tab by mouth daily. Quantity:  10 Tab Refills:  1  
     
   
   
   
  
 levothyroxine 50 mcg tablet Commonly known as:  SYNTHROID Your last dose was: Your next dose is: TAKE 1 TABLET DAILY BEFORE BREAKFAST Quantity:  90 Tab Refills:  0  
     
   
   
   
  
 lisinopril 40 mg tablet Commonly known as:  Suhail Embs Your last dose was: Your next dose is: TAKE 1 TABLET DAILY Quantity:  90 Tab Refills:  2  
     
   
   
   
  
 lovastatin 10 mg tablet Commonly known as:  MEVACOR Your last dose was: Your next dose is: TAKE 1 TABLET NIGHTLY Quantity:  90 Tab Refills:  3  
     
   
   
   
  
 M-VIT PO Your last dose was: Your next dose is: Take  by mouth. Refills:  0  
     
   
   
   
  
 ondansetron hcl 4 mg tablet Commonly known as:  Reva Sites Your last dose was: Your next dose is:    
   
   
  Refills:  0  
     
   
   
   
  
 raNITIdine 150 mg tablet Commonly known as:  ZANTAC Your last dose was: Your next dose is:    
   
   
  Refills:  0  
     
   
   
   
  
 VITAMIN B-12 1,000 mcg tablet Generic drug:  cyanocobalamin Your last dose was: Your next dose is:    
   
   
 Dose:  1000 mcg Take 1,000 mcg by mouth daily. Refills:  0  
     
   
   
   
  
 VITAMIN D3 1,000 unit tablet Generic drug:  cholecalciferol Your last dose was: Your next dose is: Take  by mouth daily. Refills:  0 STOP taking these medications   
 aspirin 81 mg tablet Where to Get Your Medications Information on where to get these meds will be given to you by the nurse or doctor. ! Ask your nurse or doctor about these medications HYDROcodone-acetaminophen 5-325 mg per tablet Discharge Instructions PATIENT INSTRUCTIONS 
GALL BLADDER SURGERY 
(CHOLECYSTECTOMY) 
  
FOLLOW-UP: Please make an appointment with your physician in 2 week(s). Call your physician immediately if you have any fevers greater than 102.5, drainage from your wound that is not clear or looks infected, persistent bleeding, increasing abdominal pain, problems urinating, or persistent nausea/vomiting. You should be aware that you may have right shoulder pain after surgery and that this will progressively go away. This is called 'referred pain' and is from the area of the gallbladder. It can also be caused by gas that may be trapped under the diaphragm from the surgery, especially if it was performed laparoscopically through mini-incisions. This gas will progressively get reabsorbed by your body.  
  
WOUND CARE INSTRUCTIONS:  Dermabond liquid skin bandage applied. You may shower after 24 hours. Do not scrape off. Once the wound has quit draining you may leave it open to air. If clothing rubs against the wound or causes irritation and the wound is not draining you may cover it with a dry dressing during the daytime. Try to keep the wound dry and avoid ointments on the wound unless directed to do so. If the wound becomes bright red and painful or starts to drain infected material that is not clear, please contact your physician immediately. You should also call if you begin to drain fluid that is thin and greenish-brown from the wound and appears to look like bile. If the wound though is mildly pink and has a thick firm ridge underneath it, this is normal, and is referred to as a healing ridge. This will resolve over the next 4-6 weeks. 
  
DIET: You may eat any foods that you can tolerate.  It is a good idea to eat a high fiber diet and take in plenty of fluids to prevent constipation. If you do become constipated you may want to take a mild laxative or take ducolax tablets on a daily basis until your bowel habits are regular. Constipation can be very uncomfortable, along with straining, after recent abdominal surgery. 
  
ACTIVITY: You are encouraged to cough and deep breath or use your incentive spirometer if you were given one, every 15-30 minutes when awake. This will help prevent respiratory complications and low grade fevers post-operatively. You may want to hug a pillow when coughing and sneezing to add additional support to the surgical area(s) which will decrease pain during these times. You are encouraged to walk and engage in light activity for the next two weeks. You should not lift more than 20 pounds during this time frame as it could put you at increased risk for a post-operative hernia. Twenty pounds is roughly equivalent to a plastic bag of groceries.  
  
MEDICATIONS: Do not take aspirin for 2 weeks. Try to take narcotic medications and anti-inflammatory medications, such as tylenol, ibuprofen, naprosyn, etc., with food. This will minimize stomach upset from the medication. Should you develop nausea and vomiting from the pain medication, or develop a rash, please discontinue the medication and contact your physician. You should not drive, make important decisions, or operate machinery when taking narcotic pain medication. 
  
QUESTIONS: Please feel free to call your physician or the hospital  if you have any questions, and they will be glad to assist you. DISCHARGE SUMMARY from your Nurse The following personal items collected during your admission are returned to you:  
Dental Appliance: Dental Appliances: None Vision: Visual Aid: Glasses Hearing Aid:   
Jewelry: Jewelry: None Clothing: Clothing: Footwear, Pants, Shirt, Socks, Undergarments (locker) Other Valuables: Other Valuables: Blake Bolton Valuables sent to safe:   
 
PATIENT INSTRUCTIONS: 
 After general anesthesia or intravenous sedation, for 24 hours or while taking prescription Narcotics: · Limit your activities · Do not drive and operate hazardous machinery · Do not make important personal or business decisions · Do  not drink alcoholic beverages · If you have not urinated within 8 hours after discharge, please contact your surgeon on call. Report the following to your surgeon: 
· Excessive pain, swelling, redness or odor of or around the surgical area · Temperature over 100.5 · Nausea and vomiting lasting longer than 4 hours or if unable to take medications · Any signs of decreased circulation or nerve impairment to extremity: change in color, persistent  numbness, tingling, coldness or increase pain · Any questions [address] Breathing deep and coughing are very important exercises to do after surgery. Deep breathing and coughing open the little air tubes and air sacks in your lungs. You take deep breaths every day. You may not even notice - it is just something you do when you sigh or yawn. It is a natural exercise you do to keep these air passages open. After surgery, take deep breaths and cough, on purpose. Coughing and deep breathing help prevent bronchitis and pneumonia after surgery. If you had chest or belly surgery, use a pillow as a \"hug buddy\" and hold it tightly to your chest or belly when you cough. DIRECTIONS: 
6. Take 10 to 15 slow deep breaths every hour while awake. 7. Breathe in deeply, and hold it for 2 seconds. 8. Exhale slowly through puckered lips, like blowing up a balloon. 9. After every 4th or 5th deep breath, hug your pillow to your chest or belly and give a hard, deep cough. Yes, it will probably hurt. But doing this exercise is very important part of healing after surgery. Take your pain medicine to help you do this exercise without too much pain. IF YOU HAVE BEEN DIAGNOSED WITH SLEEP APNEA, PLEASE USE YOUR SLEEIFP APNEA DEVICE OR CPAP MACHINE WHEN YOU INTEND TO NAP AFTER TAKING PAIN MEDICATION. Ankle Pumps Ankle pumps increase the circulation of oxygenated blood to your lower extremities and decrease your risk for circulation problems such as blood clots. They also stretch the muscles, tendons and ligaments in your foot and ankle, and prevent joint contracture in the ankle and foot, especially after surgeries on the legs. It is important to do ankle pump exercises regularly after surgery because immobility increases your risk for developing a blood clot. Your doctor may also have you take an Aspirin for the next few days as well. If your doctor did not ask you to take an Aspirin, consult with him before starting Aspirin therapy on your own. Slowly point your foot forward, feeling the muscles on the top of your lower leg stretch, and hold this position for 5 seconds. Next, pull your foot back toward you as far as possible, stretching the calf muscles, and hold that position for 5 seconds. Repeat with the other foot. Perform 10 repetitions every hour while awake for both ankles if possible (down and then up with the foot once is one repetition). You should feel gentle stretching of the muscles in your lower leg when doing this exercise. If you feel pain, or your range of motion is limited, don't  Push too hard. Only go the limit your joint and muscles will let you go. If you have increasing pain, progressively worsening leg warmth or swelling, STOP the exercise and call your doctor. Below is information about the medications your doctor is prescribing after your visit: 
 
 
 
 
Discharge Orders None [facility] SERVICES! Dear Lizzy Powell: Thank you for requesting a [employer] account. Our records indicate that you already have an active [employer] account. You can access your account anytime at [email or web address] Did you know that you can access your hospital and ER discharge instructions at any time in [employer]? You can also review all of your test results from your hospital stay or ER visit. Additional Information If you have questions, please visit the Frequently Asked Questions section of the [employer] website at [email or web address] Remember, [employer] is NOT to be used for urgent needs. For medical emergencies, dial 911. Now available from your iPhone and Android! General Information Please provide this summary of care documentation to your next provider. Patient Signature:  ____________________________________________________________ Date:  ____________________________________________________________ `    
    
 Provider Signature:  ____________________________________________________________ Date:  ____________________________________________________________

## 2017-05-12 NOTE — BRIEF OP NOTE
BRIEF OPERATIVE NOTE    Date of Procedure: 5/12/2017   Preoperative Diagnosis: Symptomatic cholelithiasis  Postoperative Diagnosis: Symptomatic cholelithiasis    Procedure(s):   LAPAROSCOPIC CHOLECYSTECTOMY  Surgeon(s) and Role:     * John Teixeira MD - Primary         Assistant Staff:       Surgical Staff:  Circ-1: uAng Ambrocio RN  Circ-Relief: Rose Mcfadden RN  Scrub Tech-1: Dread De Oliveira  Scrub RN-Relief: Asha Epstein RN  Surg Asst-1: Ailyn Mendoza LPN  Event Time In   Incision Start 5783   Incision Close      Anesthesia: General   Estimated Blood Loss: 10 cc  Specimens:   ID Type Source Tests Collected by Time Destination   1 : Gallbladder Preservative Gallbladder  John Teixeira MD 5/12/2017 1015 Pathology      Findings: Inflamed distended gallbladder.    Complications: None  Implants: * No implants in log *

## 2017-05-12 NOTE — DISCHARGE INSTRUCTIONS
PATIENT INSTRUCTIONS  GALL BLADDER SURGERY  (CHOLECYSTECTOMY)     FOLLOW-UP: Please make an appointment with your physician in 2 week(s). Call your physician immediately if you have any fevers greater than 102.5, drainage from your wound that is not clear or looks infected, persistent bleeding, increasing abdominal pain, problems urinating, or persistent nausea/vomiting. You should be aware that you may have right shoulder pain after surgery and that this will progressively go away. This is called 'referred pain' and is from the area of the gallbladder. It can also be caused by gas that may be trapped under the diaphragm from the surgery, especially if it was performed laparoscopically through mini-incisions. This gas will progressively get reabsorbed by your body.      WOUND CARE INSTRUCTIONS:  Dermabond liquid skin bandage applied. You may shower after 24 hours. Do not scrape off. Once the wound has quit draining you may leave it open to air. If clothing rubs against the wound or causes irritation and the wound is not draining you may cover it with a dry dressing during the daytime. Try to keep the wound dry and avoid ointments on the wound unless directed to do so. If the wound becomes bright red and painful or starts to drain infected material that is not clear, please contact your physician immediately. You should also call if you begin to drain fluid that is thin and greenish-brown from the wound and appears to look like bile. If the wound though is mildly pink and has a thick firm ridge underneath it, this is normal, and is referred to as a healing ridge. This will resolve over the next 4-6 weeks.     DIET: You may eat any foods that you can tolerate. It is a good idea to eat a high fiber diet and take in plenty of fluids to prevent constipation. If you do become constipated you may want to take a mild laxative or take ducolax tablets on a daily basis until your bowel habits are regular.  Constipation can be very uncomfortable, along with straining, after recent abdominal surgery.     ACTIVITY: You are encouraged to cough and deep breath or use your incentive spirometer if you were given one, every 15-30 minutes when awake. This will help prevent respiratory complications and low grade fevers post-operatively. You may want to hug a pillow when coughing and sneezing to add additional support to the surgical area(s) which will decrease pain during these times. You are encouraged to walk and engage in light activity for the next two weeks. You should not lift more than 20 pounds during this time frame as it could put you at increased risk for a post-operative hernia. Twenty pounds is roughly equivalent to a plastic bag of groceries.      MEDICATIONS: Do not take aspirin for 2 weeks. Try to take narcotic medications and anti-inflammatory medications, such as tylenol, ibuprofen, naprosyn, etc., with food. This will minimize stomach upset from the medication. Should you develop nausea and vomiting from the pain medication, or develop a rash, please discontinue the medication and contact your physician. You should not drive, make important decisions, or operate machinery when taking narcotic pain medication.     QUESTIONS: Please feel free to call your physician or the hospital  if you have any questions, and they will be glad to assist you. DISCHARGE SUMMARY from your Nurse    The following personal items collected during your admission are returned to you:   Dental Appliance: Dental Appliances: None  Vision: Visual Aid: Glasses  Hearing Aid:    Jewelry: Jewelry: None  Clothing: Clothing: Footwear, Pants, Shirt, Socks, Undergarments (locker)  Other Valuables:  Other Valuables: Eyeglasses  Valuables sent to safe:      PATIENT INSTRUCTIONS:    After general anesthesia or intravenous sedation, for 24 hours or while taking prescription Narcotics:  · Limit your activities  · Do not drive and operate hazardous machinery  · Do not make important personal or business decisions  · Do  not drink alcoholic beverages  · If you have not urinated within 8 hours after discharge, please contact your surgeon on call. Report the following to your surgeon:  · Excessive pain, swelling, redness or odor of or around the surgical area  · Temperature over 100.5  · Nausea and vomiting lasting longer than 4 hours or if unable to take medications  · Any signs of decreased circulation or nerve impairment to extremity: change in color, persistent  numbness, tingling, coldness or increase pain  · Any questions    COUGH AND DEEP BREATHE    Breathing deep and coughing are very important exercises to do after surgery. Deep breathing and coughing open the little air tubes and air sacks in your lungs. You take deep breaths every day. You may not even notice - it is just something you do when you sigh or yawn. It is a natural exercise you do to keep these air passages open. After surgery, take deep breaths and cough, on purpose. Coughing and deep breathing help prevent bronchitis and pneumonia after surgery. If you had chest or belly surgery, use a pillow as a \"hug buddy\" and hold it tightly to your chest or belly when you cough. DIRECTIONS:  6. Take 10 to 15 slow deep breaths every hour while awake. 7. Breathe in deeply, and hold it for 2 seconds. 8. Exhale slowly through puckered lips, like blowing up a balloon. 9. After every 4th or 5th deep breath, hug your pillow to your chest or belly and give a hard, deep cough. Yes, it will probably hurt. But doing this exercise is very important part of healing after surgery. Take your pain medicine to help you do this exercise without too much pain. IF YOU HAVE BEEN DIAGNOSED WITH SLEEP APNEA, PLEASE USE YOUR SLEEIFP APNEA DEVICE OR CPAP MACHINE WHEN YOU INTEND TO NAP AFTER TAKING PAIN MEDICATION.     Ankle Pumps    Ankle pumps increase the circulation of oxygenated blood to your lower extremities and decrease your risk for circulation problems such as blood clots. They also stretch the muscles, tendons and ligaments in your foot and ankle, and prevent joint contracture in the ankle and foot, especially after surgeries on the legs. It is important to do ankle pump exercises regularly after surgery because immobility increases your risk for developing a blood clot. Your doctor may also have you take an Aspirin for the next few days as well. If your doctor did not ask you to take an Aspirin, consult with him before starting Aspirin therapy on your own. Slowly point your foot forward, feeling the muscles on the top of your lower leg stretch, and hold this position for 5 seconds. Next, pull your foot back toward you as far as possible, stretching the calf muscles, and hold that position for 5 seconds. Repeat with the other foot. Perform 10 repetitions every hour while awake for both ankles if possible (down and then up with the foot once is one repetition). You should feel gentle stretching of the muscles in your lower leg when doing this exercise. If you feel pain, or your range of motion is limited, don't  Push too hard. Only go the limit your joint and muscles will let you go. If you have increasing pain, progressively worsening leg warmth or swelling, STOP the exercise and call your doctor. Below is information about the medications your doctor is prescribing after your visit:    Other information in your discharge envelope:  []     PRESCRIPTIONS  []     PHYSICAL THERAPY PRESCRIPTION  []     APPOINTMENT CARDS  []     Regional Anesthesia Pamphlet for block or block with On-Q Catheter from Anesthesia Service  []     Medical device information sheets/pamphlets from their    []     School/work excuse note. []     /parent work excuse note.       These are general instructions for a healthy lifestyle:    *  Please give a list of your current medications to your Primary Care Provider. *  Please update this list whenever your medications are discontinued, doses are      changed, or new medications (including over-the-counter products) are added. *  Please carry medication information at all times in case of emergency situations. About Smoking  No smoking / No tobacco products / Avoid exposure to second hand smoke    Surgeon General's Warning:  Quitting smoking now greatly reduces serious risk to your health. Obesity, smoking, and sedentary lifestyle greatly increases your risk for illness and disease. A healthy diet, regular physical exercise & weight monitoring are important for maintaining a healthy lifestyle. Congestive Heart Failure  You may be retaining fluid if you have a history of heart failure or if you experience any of the following symptoms:  Weight gain of 3 pounds or more overnight or 5 pounds in a week, increased swelling in our hands or feet or shortness of breath while lying flat in bed. Please call your doctor as soon as you notice any of these symptoms; do not wait until your next office visit. Recognize signs and symptoms of STROKE:  F - face looks uneven  A - arms unable to move or move even  S - speech slurred or non-existent  T - time-call 911 as soon as signs and symptoms begin-DO NOT go         Back to bed or wait to see if you get better-TIME IS BRAIN. Warning signs of HEART ATTACK  Call 911 if you have these symptoms    · Chest discomfort. Most heart attacks involve discomfort in the center of the chest that lasts more than a few minutes, or that goes away and comes back. It can feel like uncomfortable pressure, squeezing, fullness, or pain. · Discomfort in other areas of the upper body. Symptoms can include pain or discomfort in one or both        Arms, the back, neck, jaw, or stomach. ·  Shortness of breath with or without chest discomfort.   · Other signs may include breaking out in a cold sweat, nausea, or lightheadedness    Don't wait more than five minutes to call 911 - MINUTES MATTER! Fast action can save your life. Calling 911 is almost always the fastest way to get lifesaving treatment. Emergency Medical Services staff can begin treatment when they arrive - up to an hour sooner than if someone gets to the hospital by car. JANETH VILLALBA MEDICATION AND SIDE EFFECT GUIDE    The Lissy Sarabia MEDICATION AND SIDE EFFECT GUIDE was provided to the PATIENT AND CARE PROVIDER.   Information provided includes instruction about drug purpose and common side effects for the following medications:    · Norco

## 2017-05-12 NOTE — H&P
Surgical H&P Update    Patient seen and examined. No changes in health since clinic appointment. All questions regarding risk and benefits of surgical procedure answered. Consent for laparoscopic cholecystectomy possible open obtained.     Jonathon Brandt MD  05/12/17

## 2017-05-12 NOTE — ANESTHESIA POSTPROCEDURE EVALUATION
Post-Anesthesia Evaluation and Assessment    Patient: Jay Jay Gan MRN: [id number]  SSN: [id number]    YOB: 1955  Age: 62 y.o. Sex: female       Cardiovascular Function/Vital Signs  Visit Vitals    /70    Pulse 75    Temp 36.4 °C (97.6 °F)    Resp 19    Ht 5' 6\" (1.676 m)    Wt 107.2 kg (236 lb 5.3 oz)    SpO2 92%    BMI 38.15 kg/m2       Patient is status post general anesthesia for Procedure(s):  LAPAROSCOPIC CHOLECYSTECTOMY. Nausea/Vomiting: None    Postoperative hydration reviewed and adequate. Pain:  Pain Scale 1: Numeric (0 - 10) (05/12/17 1125)  Pain Intensity 1: 0 (05/12/17 1125)   Managed    Neurological Status:   Neuro (WDL): Within Defined Limits (05/12/17 1125)  Neuro  Neurologic State: Alert (05/12/17 1125)  Orientation Level: Oriented X4 (05/12/17 0745)  Cognition: Appropriate decision making; Appropriate safety awareness (05/12/17 0745)  LUE Motor Response: Moderate; Purposeful (05/12/17 1100)  LLE Motor Response: Moderate; Purposeful (05/12/17 1100)  RUE Motor Response: Moderate; Purposeful (05/12/17 1100)  RLE Motor Response: Moderate; Purposeful (05/12/17 1100)   At baseline    Mental Status and Level of Consciousness: Arousable    Pulmonary Status:   O2 Device: Room air (05/12/17 1125)   Adequate oxygenation and airway patent    Complications related to anesthesia: None    Post-anesthesia assessment completed.  No concerns    Signed By: Yanci Almeida MD     May 12, 2017

## 2017-05-12 NOTE — ANESTHESIA PREPROCEDURE EVALUATION
Anesthetic History   No history of anesthetic complications            Review of Systems / Medical History  Patient summary reviewed, nursing notes reviewed and pertinent labs reviewed    Pulmonary  Within defined limits                 Neuro/Psych   Within defined limits           Cardiovascular  Within defined limits  Hypertension                   GI/Hepatic/Renal  Within defined limits         Liver disease     Endo/Other  Within defined limits    Hypothyroidism  Morbid obesity     Other Findings              Physical Exam    Airway  Mallampati: II  TM Distance: 4 - 6 cm  Neck ROM: normal range of motion   Mouth opening: Normal     Cardiovascular    Rhythm: regular  Rate: normal         Dental    Dentition: Poor dentition     Pulmonary  Breath sounds clear to auscultation               Abdominal         Other Findings            Anesthetic Plan    ASA: 2  Anesthesia type: general            Anesthetic plan and risks discussed with: Patient

## 2017-05-15 ENCOUNTER — PATIENT OUTREACH (OUTPATIENT)
Dept: FAMILY MEDICINE CLINIC | Age: 62
End: 2017-05-15

## 2017-05-15 NOTE — PROGRESS NOTES
[facility] 5/12/2017 Scheduled cholecystectomy    NN unable to reach by phone. Voicemail left. Will try again later.

## 2017-05-17 ENCOUNTER — PATIENT OUTREACH (OUTPATIENT)
Dept: FAMILY MEDICINE CLINIC | Age: 62
End: 2017-05-17

## 2017-05-17 RX ORDER — ASPIRIN 81 MG/1
TABLET ORAL DAILY
COMMUNITY

## 2017-05-17 NOTE — PROGRESS NOTES
[facility] 5/12/2017 Scheduled lap cholecystectomy    Please note functional assessment. Patient stated \"I\"m doing good. Everything's seems to be better every day. Everything's working like it should\". Patient denies fever, chills, nausea, vomiting, chest pain, sob or inability to take medications. NN discussed red flags with patient who verbalized understanding of when to seek immediate medical attention. Red flags/sepsis: fever or below normal temperature (97f), chills, nausea, vomiting, diarrhea, chest pain, shortness of breath, unable to take medications, unusual sensations, and BFAST. Patient reports that pain is minimal and she is only taking her pain med in the evening. Reports good appetite with normal BMs. Patient encouraged to cough and deep breath. Patient not taking 81 mg ASA x 2 weeks. Patient will follow up with surgeon on 5/23/2017. No upcoming PCP appointments, but will call if needed. Goals      Attends follow-up appointments as directed. Surgeon 5/23/2017       Prevent complications post hospitalization.  Understands red flags post discharge. Red flags/sepsis: fever or below normal temperature (97f), chills, nausea, vomiting, diarrhea, chest pain, shortness of breath, unable to take medications, unusual sensations, and BFAST. RRAT score: outpatient admission    Advance Medical Directive on file in EMR? no     Medical History:     Past Medical History:   Diagnosis Date    Abnormal LFT's     Abnormal weight gain     Anxiety     Depression     Dysmetabolic syndrome X     Hypercholesterolemia     Hypertension     Irritable bowel syndrome     Thyroid disease      Called patient on 5/17/2017 and verified patient with 2 identifiers. Fall Risk Assessment:  No flowsheet data found. Medication Reconciliation completed: yes     Support System consists of: spouse    [address], if so what agency?  no    Adherence to previous treatment and likelihood for f/u: good    Past Hospitalizations and/or ED visits last 12 months? 0    NN remains available to patient.

## 2017-05-24 RX ORDER — LISINOPRIL 40 MG/1
TABLET ORAL
Qty: 90 TAB | Refills: 2 | Status: SHIPPED | OUTPATIENT
Start: 2017-05-24 | End: 2018-02-18 | Stop reason: SDUPTHER

## 2017-09-05 RX ORDER — LEVOTHYROXINE SODIUM 50 UG/1
TABLET ORAL
Qty: 90 TAB | Refills: 3 | Status: SHIPPED | OUTPATIENT
Start: 2017-09-05 | End: 2018-09-02 | Stop reason: SDUPTHER

## 2017-09-05 NOTE — TELEPHONE ENCOUNTER
----- Message from Terence Kapadia sent at 9/4/2017  9:14 AM EDT -----  Regarding: Prescription Question  Contact: [phone]  Please refill my L-Thyroxine 50 mg with [facility]. They cancelled the prescription back in July when no one responded to their inquiry to refill. Fortunately, my mother is on the same so I'm not out but this does need to be resolved.     Thanks,  [employer]

## 2017-10-10 ENCOUNTER — OFFICE VISIT (OUTPATIENT)
Dept: FAMILY MEDICINE CLINIC | Age: 62
End: 2017-10-10

## 2017-10-10 VITALS
HEART RATE: 51 BPM | TEMPERATURE: 97.6 F | DIASTOLIC BLOOD PRESSURE: 80 MMHG | BODY MASS INDEX: 36.48 KG/M2 | SYSTOLIC BLOOD PRESSURE: 131 MMHG | OXYGEN SATURATION: 97 % | HEIGHT: 66 IN | WEIGHT: 227 LBS | RESPIRATION RATE: 16 BRPM

## 2017-10-10 DIAGNOSIS — I10 ESSENTIAL HYPERTENSION: Chronic | ICD-10-CM

## 2017-10-10 DIAGNOSIS — Z23 ENCOUNTER FOR IMMUNIZATION: ICD-10-CM

## 2017-10-10 DIAGNOSIS — K75.81 NASH (NONALCOHOLIC STEATOHEPATITIS): Chronic | ICD-10-CM

## 2017-10-10 DIAGNOSIS — E03.9 HYPOTHYROIDISM, UNSPECIFIED TYPE: Chronic | ICD-10-CM

## 2017-10-10 DIAGNOSIS — F17.200 SMOKER: ICD-10-CM

## 2017-10-10 DIAGNOSIS — R73.9 ELEVATED BLOOD SUGAR: Primary | Chronic | ICD-10-CM

## 2017-10-10 DIAGNOSIS — E78.00 HYPERCHOLESTEROLEMIA: Chronic | ICD-10-CM

## 2017-10-10 NOTE — MR AVS SNAPSHOT
Visit Information Date & Time Provider Department Dept. Phone Encounter #  
 10/10/2017  8:20 AM Kleber Beatty, Amy Carrillo [id number] Follow-up Instructions Return in about 6 months (around 4/10/2018). Upcoming Health Maintenance Date Due ZOSTER VACCINE AGE 60> 5/13/2016 PAP AKA CERVICAL CYTOLOGY 8/2/2016 BREAST CANCER SCRN MAMMOGRAM 8/26/2016 INFLUENZA AGE 9 TO ADULT 8/1/2017 COLONOSCOPY 11/19/2022 DTaP/Tdap/Td series (2 - Td) 1/27/2025 Allergies as of 10/10/2017  Review Complete On: 10/10/2017 By: Michael Stauffer LPN Severity Noted Reaction Type Reactions Pravachol [Pravastatin]  05/21/2010    Myalgia Zetia [Ezetimibe]  05/21/2010    Myalgia Current Immunizations  Reviewed on 11/18/2015 Name Date Influenza Vaccine 10/28/2013 Influenza Vaccine (Quad) PF 10/26/2016, 11/18/2015, 12/12/2014 Influenza Vaccine Split 10/12/2012, 2/11/2011 Pneumococcal Polysaccharide (PPSV-23) 7/19/2013 Tdap 1/27/2015 Not reviewed this visit You Were Diagnosed With   
  
 Codes Comments Elevated blood sugar    -  Primary ICD-10-CM: R73.9 ICD-9-CM: 790.29 ERICKSON (nonalcoholic steatohepatitis)     ICD-10-CM: Y98.77 ICD-9-CM: 571.8 Hypothyroidism, unspecified type     ICD-10-CM: E03.9 ICD-9-CM: 244.9 Essential hypertension     ICD-10-CM: I10 
ICD-9-CM: 401.9 Hypercholesterolemia     ICD-10-CM: E78.00 ICD-9-CM: 272.0 Smoker     ICD-10-CM: O78.104 ICD-9-CM: 305.1 Vitals BP Pulse Temp Resp Height(growth percentile) Weight(growth percentile) 131/80 (BP 1 Location: Right arm, BP Patient Position: Sitting) (!) 51 97.6 °F (36.4 °C) (Oral) 16 5' 6\" (1.676 m) 227 lb (103 kg) SpO2 BMI OB Status Smoking Status 97% 36.64 kg/m2 Postmenopausal Current Every Day Smoker Vitals History BMI and BSA Data Body Mass Index Body Surface Area 36.64 kg/m 2 2.19 m 2 Preferred Pharmacy Pharmacy Name Phone [facility] [address] [address] [phone] Your Updated Medication List  
  
   
This list is accurate as of: 10/10/17  8:46 AM.  Always use your most recent med list.  
  
  
  
  
 albuterol 90 mcg/actuation inhaler Commonly known as:  PROVENTIL HFA, VENTOLIN HFA, PROAIR HFA Take 1 Puff by inhalation every four (4) hours as needed for Wheezing. amLODIPine 10 mg tablet Commonly known as:  Tad Aiden TAKE 1 TABLET DAILY FOR HIGH BLOOD PRESSURE  
  
 aspirin delayed-release 81 mg tablet Take  by mouth daily. atenolol 25 mg tablet Commonly known as:  TENORMIN  
TAKE 1 TABLET TWICE A DAY CLARITIN 10 mg tablet Generic drug:  loratadine Take 10 mg by mouth daily. FISH OIL 1,200-144-216 mg Cap Generic drug:  fish oil-dha-epa Take  by mouth. hydroCHLOROthiazide 12.5 mg tablet Commonly known as:  HYDRODIURIL Take 1 Tab by mouth daily. * HYDROcodone-acetaminophen  mg tablet Commonly known as:  NORCO  
  
 * HYDROcodone-acetaminophen 5-325 mg per tablet Commonly known as:  Rica Bazzi Take 1-2 Tabs by mouth every four (4) hours as needed for Pain. Max Daily Amount: 12 Tabs. levothyroxine 50 mcg tablet Commonly known as:  SYNTHROID  
TAKE 1 TABLET DAILY BEFORE BREAKFAST  
  
 lisinopril 40 mg tablet Commonly known as:  PRINIVIL, ZESTRIL  
TAKE 1 TABLET DAILY lovastatin 10 mg tablet Commonly known as:  MEVACOR  
TAKE 1 TABLET NIGHTLY  
  
 M-VIT PO Take  by mouth. ondansetron hcl 4 mg tablet Commonly known as:  ZOFRAN  
  
 raNITIdine 150 mg tablet Commonly known as:  ZANTAC  
  
 varicella zoster vacine live 19,400 unit/0.65 mL Susr injection Commonly known as:  varicella-zoster vacine live 1 Vial by SubCUTAneous route once for 1 dose.  To be administered at Pharmacy. Fax confirmation to me at [phone]. Thank You. Indications: PREVENTION OF HERPES ZOSTER  
  
 VITAMIN B-12 1,000 mcg tablet Generic drug:  cyanocobalamin Take 1,000 mcg by mouth daily. VITAMIN D3 1,000 unit tablet Generic drug:  cholecalciferol Take  by mouth daily. * Notice: This list has 2 medication(s) that are the same as other medications prescribed for you. Read the directions carefully, and ask your doctor or other care provider to review them with you. Prescriptions Printed Refills  
 varicella zoster vacine live (VARICELLA-ZOSTER VACINE LIVE) 19,400 unit/0.65 mL susr injection 0 Si Vial by SubCUTAneous route once for 1 dose. To be administered at Pharmacy. Fax confirmation to me at [phone]. Thank You. Indications: PREVENTION OF HERPES ZOSTER Class: Print Route: SubCUTAneous We Performed the Following HEMOGLOBIN A1C WITH EAG [12547 CPT(R)] LIPID PANEL [81222 CPT(R)] METABOLIC PANEL, COMPREHENSIVE [30145 CPT(R)] MICROALBUMIN, UR, 24 HR [id number] CPT(R)] T4, FREE [id number] CPT(R)] TSH 3RD GENERATION [75233 CPT(R)] Follow-up Instructions Return in about 6 months (around 4/10/2018). Patient Instructions A Healthy Lifestyle: Care Instructions Your Care Instructions A healthy lifestyle can help you feel good, stay at a healthy weight, and have plenty of energy for both work and play. A healthy lifestyle is something you can share with your whole family. A healthy lifestyle also can lower your risk for serious health problems, such as high blood pressure, heart disease, and diabetes. You can follow a few steps listed below to improve your health and the health of your family. Follow-up care is a key part of your treatment and safety. Be sure to make and go to all appointments, and call your doctor if you are having problems.  Its also a good idea to know your test results and keep a list of the medicines you take. How can you care for yourself at home? · Do not eat too much sugar, fat, or fast foods. You can still have dessert and treats now and then. The goal is moderation. · Start small to improve your eating habits. Pay attention to portion sizes, drink less juice and soda pop, and eat more fruits and vegetables. ¨ Eat a healthy amount of food. A 3-ounce serving of meat, for example, is about the size of a deck of cards. Fill the rest of your plate with vegetables and whole grains. ¨ Limit the amount of soda and sports drinks you have every day. Drink more water when you are thirsty. ¨ Eat at least 5 servings of fruits and vegetables every day. It may seem like a lot, but it is not hard to reach this goal. A serving or helping is 1 piece of fruit, 1 cup of vegetables, or 2 cups of leafy, raw vegetables. Have an apple or some carrot sticks as an afternoon snack instead of a candy bar. Try to have fruits and/or vegetables at every meal. 
· Make exercise part of your daily routine. You may want to start with simple activities, such as walking, bicycling, or slow swimming. Try to be active 30 to 60 minutes every day. You do not need to do all 30 to 60 minutes all at once. For example, you can exercise 3 times a day for 10 or 20 minutes. Moderate exercise is safe for most people, but it is always a good idea to talk to your doctor before starting an exercise program. 
· Keep moving. Jermain Cluck the lawn, work in the garden, or ERUCES. Take the stairs instead of the elevator at work. · If you smoke, quit. People who smoke have an increased risk for heart attack, stroke, cancer, and other lung illnesses. Quitting is hard, but there are ways to boost your chance of quitting tobacco for good. ¨ Use nicotine gum, patches, or lozenges. ¨ Ask your doctor about stop-smoking programs and medicines. ¨ Keep trying.  
In addition to reducing your risk of diseases in the future, you will notice some benefits soon after you stop using tobacco. If you have shortness of breath or asthma symptoms, they will likely get better within a few weeks after you quit. · Limit how much alcohol you drink. Moderate amounts of alcohol (up to 2 drinks a day for men, 1 drink a day for women) are okay. But drinking too much can lead to liver problems, high blood pressure, and other health problems. Family health If you have a family, there are many things you can do together to improve your health. · Eat meals together as a family as often as possible. · Eat healthy foods. This includes fruits, vegetables, lean meats and dairy, and whole grains. · Include your family in your fitness plan. Most people think of activities such as jogging or tennis as the way to fitness, but there are many ways you and your family can be more active. Anything that makes you breathe hard and gets your heart pumping is exercise. Here are some tips: 
¨ Walk to do errands or to take your child to school or the bus. ¨ Go for a family bike ride after dinner instead of watching TV. Where can you learn more? Go to http://rhodaSociocastmaggie.info/. Enter A147 in the search box to learn more about \"A Healthy Lifestyle: Care Instructions. \" Current as of: July 26, 2016 Content Version: 11.3 © 6144-3224 [employer]. Care instructions adapted under license by [employer] (which disclaims liability or warranty for this information). If you have questions about a medical condition or this instruction, always ask your healthcare professional. [place] [address] any warranty or liability for your use of this information. Introducing [facility]! Dear Walter Mulligan: Thank you for requesting a Filmzu account. Our records indicate that you already have an active Film[employer] account. You can access your account anytime at [email or web address] Did you know that you can access your hospital and ER discharge instructions at any time in [employer]? You can also review all of your test results from your hospital stay or ER visit. Additional Information If you have questions, please visit the Frequently Asked Questions section of the [employer] website at [email or web address] Remember, [employer] is NOT to be used for urgent needs. For medical emergencies, dial 911. Now available from your iPhone and Android! Please provide this summary of care documentation to your next provider. Your primary care clinician is listed as [address]. If you have any questions after today's visit, please call [phone].

## 2017-10-10 NOTE — PATIENT INSTRUCTIONS

## 2017-10-10 NOTE — PROGRESS NOTES
Progress Note    Patient: Bentley Moctezuma MRN: [id number]  SSN: [id number]    YOB: 1955  Age: 65 y.o. Sex: female        Chief Complaint   Patient presents with    Hypertension    Thyroid Problem    Cholesterol Problem         Subjective:     Encounter Diagnoses   Name Primary?  Elevated blood sugar:She has lost 9 pounds. No sx of fulminant diabetes. No significant weight loss or gain. The patient realizes that without weight loss and exercise they are high risk for overt diabetes. Lab Results   Component Value Date/Time    Hemoglobin A1c 6.0 04/27/2017 01:34 PM     Lab Results   Component Value Date/Time    Glucose 116 04/27/2017 01:34 PM     Wt Readings from Last 3 Encounters:   10/10/17 227 lb (103 kg)   05/12/17 236 lb 5.3 oz (107.2 kg)   05/05/17 239 lb (108.4 kg)     Body mass index is 36.64 kg/(m^2). Yes    ERICKSON (nonalcoholic steatohepatitis): Will recheck liver function. She is not showing any signs of cirrhosis. Lab Results   Component Value Date/Time    ALT (SGPT) 43 04/27/2017 01:34 PM    AST (SGOT) 37 04/27/2017 01:34 PM    Alk. phosphatase 64 04/27/2017 01:34 PM    Bilirubin, total 0.4 04/27/2017 01:34 PM          Hypothyroidism, unspecified :  Lab Results   Component Value Date/Time    TSH 3.370 04/27/2017 01:34 PM    T4, Free 1.16 04/27/2017 01:34 PM      Denies fatigue, nervousness,weight changes, heat orcold intolerance, bowel changes,skin changes, cardiovascular symptoms, hair loss, feeling excessive energy, tremor, palpitations and weight loss. Thyroid medication has been unchanged since last medication check and labs.  Essential hypertension:  BP Readings from Last 3 Encounters:   10/10/17 131/80   05/12/17 135/70   05/05/17 111/62     The patient reports:  taking medications as instructed, no medication side effects noted, no TIA's, no chest pain on exertion, no dyspnea on exertion, no swelling of ankles.      Lab Results   Component Value Date/Time Sodium 141 04/27/2017 01:34 PM    Potassium 4.4 04/27/2017 01:34 PM    Chloride 101 04/27/2017 01:34 PM    CO2 24 04/27/2017 01:34 PM    Anion gap 6 03/13/2016 06:32 PM    Glucose 116 04/27/2017 01:34 PM    BUN 12 04/27/2017 01:34 PM    Creatinine 0.59 04/27/2017 01:34 PM    BUN/Creatinine ratio 20 04/27/2017 01:34 PM    GFR est  04/27/2017 01:34 PM    GFR est non- 04/27/2017 01:34 PM    Calcium 9.2 04/27/2017 01:34 PM    Bilirubin, total 0.4 04/27/2017 01:34 PM    AST (SGOT) 37 04/27/2017 01:34 PM    Alk. phosphatase 64 04/27/2017 01:34 PM    Protein, total 6.8 04/27/2017 01:34 PM    Albumin 4.0 04/27/2017 01:34 PM    Globulin 4.2 03/13/2016 06:32 PM    A-G Ratio 1.4 04/27/2017 01:34 PM    ALT (SGPT) 43 04/27/2017 01:34 PM     Our goal is to normalize the blood pressure to decrease the risks of strokes and heart attacks. The patient is in agreement with the plan.  Hypercholesterolemia:  Cardiovascular risks for her are: LDL goal is under 100. Currently she takes Mevacor (lovastatin) 10 mg is all she tolerates  Lab Results   Component Value Date/Time    Cholesterol, total 200 04/27/2017 01:34 PM    HDL Cholesterol 62 04/27/2017 01:34 PM    LDL, calculated 120 04/27/2017 01:34 PM    Triglyceride 92 04/27/2017 01:34 PM    CHOL/HDL Ratio 4.2 12/04/2009 08:36 AM     Lab Results   Component Value Date/Time    ALT (SGPT) 43 04/27/2017 01:34 PM    AST (SGOT) 37 04/27/2017 01:34 PM    Alk. phosphatase 64 04/27/2017 01:34 PM    Bilirubin, total 0.4 04/27/2017 01:34 PM      Myalgias: No   Fatigue: No   Other side effects: no  Wt Readings from Last 3 Encounters:   10/10/17 227 lb (103 kg)   05/12/17 236 lb 5.3 oz (107.2 kg)   05/05/17 239 lb (108.4 kg)     The patient is aware of our goal to reduce or eliminate the long term problems (such as strokes and heart attacks) related to poorly controlled hyperlipidemia.  Smoker:She is under so much stress she's now smoking greater than a pack a day. She still has less than 30 pack your smoking. I told her when she reaches that level we have to start screaming her for lung cancer. On exam her long sounded better than her last visit         Encounter for immunization:Flu vaccine given. Prescription for shingles vaccine to be given in two weeks in the pharmacy. Current and past medical information:    Current Medications after this visit[de-identified]     Current Outpatient Prescriptions   Medication Sig    varicella zoster vacine live (VARICELLA-ZOSTER VACINE LIVE) 19,400 unit/0.65 mL susr injection 1 Vial by SubCUTAneous route once for 1 dose. To be administered at Pharmacy. Fax confirmation to me at [phone]. Thank You. Indications: PREVENTION OF HERPES ZOSTER    levothyroxine (SYNTHROID) 50 mcg tablet TAKE 1 TABLET DAILY BEFORE BREAKFAST    lisinopril (PRINIVIL, ZESTRIL) 40 mg tablet TAKE 1 TABLET DAILY    aspirin delayed-release 81 mg tablet Take  by mouth daily.  amLODIPine (NORVASC) 10 mg tablet TAKE 1 TABLET DAILY FOR HIGH BLOOD PRESSURE    atenolol (TENORMIN) 25 mg tablet TAKE 1 TABLET TWICE A DAY    lovastatin (MEVACOR) 10 mg tablet TAKE 1 TABLET NIGHTLY    albuterol (PROVENTIL HFA, VENTOLIN HFA, PROAIR HFA) 90 mcg/actuation inhaler Take 1 Puff by inhalation every four (4) hours as needed for Wheezing.  hydrochlorothiazide (HYDRODIURIL) 12.5 mg tablet Take 1 Tab by mouth daily.  PV W-O TESFAYE/FERROUS FUMARATE/FA (M-VIT PO) Take  by mouth.  loratadine (CLARITIN) 10 mg tablet Take 10 mg by mouth daily.  cholecalciferol, vitamin d3, (VITAMIN D) 1,000 unit tablet Take  by mouth daily.  cyanocobalamin (VITAMIN B-12) 1,000 mcg tablet Take 1,000 mcg by mouth daily.  HYDROcodone-acetaminophen (NORCO) 5-325 mg per tablet Take 1-2 Tabs by mouth every four (4) hours as needed for Pain. Max Daily Amount: 12 Tabs.     HYDROcodone-acetaminophen (NORCO)  mg tablet     ondansetron hcl (ZOFRAN) 4 mg tablet     raNITIdine (ZANTAC) 150 mg tablet     fish oil-dha-epa (FISH OIL) 1,200-144-216 mg Cap Take  by mouth. No current facility-administered medications for this visit. Patient Active Problem List    Diagnosis Date Noted    Elevated blood sugar 07/15/2015    Smoker 07/22/2013    ERICKSON (nonalcoholic steatohepatitis) 07/19/2013    Hypothyroidism 11/16/2012    Colon polyps 11/13/2012    Hypertension     Irritable bowel syndrome     Hypercholesterolemia     Dysmetabolic syndrome X     Elevated LFTs     Abnormal weight gain     Anxiety        Past Medical History:   Diagnosis Date    Abnormal LFT's     Abnormal weight gain     Anxiety     Depression     Dysmetabolic syndrome X     Hypercholesterolemia     Hypertension     Irritable bowel syndrome     Thyroid disease        Allergies   Allergen Reactions    Pravachol [Pravastatin] Myalgia    Zetia [Ezetimibe] Myalgia       Past Surgical History:   Procedure Laterality Date    HX CYST REMOVAL      neck    HX OTHER SURGICAL      DNC around 26years of age   [de-identified] OTHER SURGICAL      wisdom teeth extraction       Social History     Social History    Marital status:      Spouse name: N/A    Number of children: N/A    Years of education: N/A     Social History Main Topics    Smoking status: Current Every Day Smoker     Packs/day: 1.00     Years: 35.00    Smokeless tobacco: Never Used    Alcohol use Yes      Comment: 2 boxes of wine each week    Drug use: No    Sexual activity: Yes     Partners: Male     Other Topics Concern    None     Social History Narrative       Review of Systems   Constitutional: Negative. Negative for chills, fever, malaise/fatigue and weight loss. Long days, excessive tiredness   HENT: Negative. Negative for hearing loss. Eyes: Negative. Negative for blurred vision and double vision. Respiratory: Negative. Negative for cough, hemoptysis, sputum production, shortness of breath and wheezing. Cardiovascular: Negative. Negative for chest pain, palpitations and orthopnea. Gastrointestinal: Negative. Negative for abdominal pain, blood in stool, heartburn, nausea and vomiting. Genitourinary: Negative. Negative for dysuria, frequency and urgency. Musculoskeletal: Negative. Negative for back pain, myalgias and neck pain. Skin: Negative. Negative for rash. Neurological: Negative. Negative for dizziness, tingling, tremors, weakness and headaches. Endo/Heme/Allergies: Negative. Psychiatric/Behavioral: Negative. Negative for depression. Objective:     Vitals:    10/10/17 0828   BP: 131/80   Pulse: (!) 51   Resp: 16   Temp: 97.6 °F (36.4 °C)   TempSrc: Oral   SpO2: 97%   Weight: 227 lb (103 kg)   Height: 5' 6\" (1.676 m)      Body mass index is 36.64 kg/(m^2). Physical Exam   Constitutional: She is oriented to person, place, and time and well-developed, well-nourished, and in no distress. No distress. HENT:   Head: Normocephalic and atraumatic. Mouth/Throat: Oropharynx is clear and moist.   Eyes: Conjunctivae are normal.   Neck: No tracheal deviation present. No thyromegaly present. Cardiovascular: Normal rate, regular rhythm and normal heart sounds. No murmur heard. Pulmonary/Chest: Effort normal and breath sounds normal. No respiratory distress. Abdominal: Soft. She exhibits no distension and no mass. There is no tenderness. Lymphadenopathy:     She has no cervical adenopathy. Neurological: She is alert and oriented to person, place, and time. Skin: Skin is warm. No rash noted. She is not diaphoretic. No erythema. Psychiatric: Mood and affect normal.   Nursing note and vitals reviewed. Health Maintenance Due   Topic Date Due    ZOSTER VACCINE AGE 60>  05/13/2016    PAP AKA CERVICAL CYTOLOGY  08/02/2016    BREAST CANCER SCRN MAMMOGRAM  08/26/2016    INFLUENZA AGE 9 TO ADULT  08/01/2017         Assessment and orders:       ICD-10-CM ICD-9-CM    1. Elevated blood sugar-Repeat labs R73.9 790.29 HEMOGLOBIN A1C WITH EAG      LIPID PANEL      METABOLIC PANEL, COMPREHENSIVE      MICROALBUMIN, UR, 24 HR   2. ERICKSON (nonalcoholic steatohepatitis)-Repeat labs K75.81 571.8 HEMOGLOBIN A1C WITH EAG      LIPID PANEL      METABOLIC PANEL, COMPREHENSIVE   3. Hypothyroidism, unspecified type  -Repeat labs E03.9 244.9 T4, FREE      TSH 3RD GENERATION   4. Essential hypertension  -Well-controlled I10 401.9 HEMOGLOBIN A1C WITH EAG      LIPID PANEL      METABOLIC PANEL, COMPREHENSIVE   5. Hypercholesterolemia-Repeat labs E78.00 272.0 LIPID PANEL      METABOLIC PANEL, COMPREHENSIVE   6. Smoker-She is not ready to quit due to the stress our life. Says like she's going to have to put her mother in the nursing home. Her mother suffers from severe dementia. F17.200 305.1    7. Encounter for immunization Z23 V03.89 [place] IMMUNIZ ADMIN,1 SINGLE/COMB VAC/TOXOID      INFLUENZA VIRUS VAC QUAD,SPLIT,PRESV FREE SYRINGE IM       ZostaVAX prescription given         Plan of care:  Discussed diagnoses in detail with patient. Medication risks/benefits/side effects discussed with patient. All of the patient's questions were addressed. The patient understands and agrees with our plan of care. The patient knows to call back if they are unsure of or forget any changes we discussed today or if the symptoms change. The patient received an After-Visit Summary which contains VS, orders, medication list and allergy list. This can be used as a \"mini-medical record\" should they have to seek medical care while out of town. Patient Care Team:  Karri Vivas MD as PCP - Lori Garcia MD as Physician (Gastroenterology)  Ailyn Hill MD (General Surgery)  Jannie Nuñez, RN as Ambulatory Care Navigator    Follow-up Disposition:  Return in about 6 months (around 4/10/2018). No future appointments.     Signed By: Karri Vivas MD     October 10, 2017

## 2017-10-10 NOTE — PROGRESS NOTES
Reviewed record in preparation for visit and have obtained necessary documentation. Patient did not bring medications to visit for review. Information provided on Advanced Directive, Living Will. Body mass index is 36.64 kg/(m^2).    Health Maintenance Due   Topic Date Due    ZOSTER VACCINE AGE 60>  05/13/2016    PAP AKA CERVICAL CYTOLOGY  08/02/2016    BREAST CANCER SCRN MAMMOGRAM  08/26/2016    INFLUENZA AGE 9 TO ADULT  08/01/2017

## 2017-10-11 LAB
ALBUMIN SERPL-MCNC: 4 G/DL (ref 3.6–4.8)
ALBUMIN/GLOB SERPL: 1.5 {RATIO} (ref 1.2–2.2)
ALP SERPL-CCNC: 71 IU/L (ref 39–117)
ALT SERPL-CCNC: 37 IU/L (ref 0–32)
AST SERPL-CCNC: 34 IU/L (ref 0–40)
BILIRUB SERPL-MCNC: 0.5 MG/DL (ref 0–1.2)
BUN SERPL-MCNC: 8 MG/DL (ref 8–27)
BUN/CREAT SERPL: 14 (ref 12–28)
CALCIUM SERPL-MCNC: 9.2 MG/DL (ref 8.7–10.3)
CHLORIDE SERPL-SCNC: 101 MMOL/L (ref 96–106)
CHOLEST SERPL-MCNC: 206 MG/DL (ref 100–199)
CO2 SERPL-SCNC: 26 MMOL/L (ref 18–29)
CREAT SERPL-MCNC: 0.56 MG/DL (ref 0.57–1)
EST. AVERAGE GLUCOSE BLD GHB EST-MCNC: 111 MG/DL
GLOBULIN SER CALC-MCNC: 2.7 G/DL (ref 1.5–4.5)
GLUCOSE SERPL-MCNC: 103 MG/DL (ref 65–99)
HBA1C MFR BLD: 5.5 % (ref 4.8–5.6)
HDLC SERPL-MCNC: 58 MG/DL
LDLC SERPL CALC-MCNC: 128 MG/DL (ref 0–99)
POTASSIUM SERPL-SCNC: 4.8 MMOL/L (ref 3.5–5.2)
PROT SERPL-MCNC: 6.7 G/DL (ref 6–8.5)
SODIUM SERPL-SCNC: 139 MMOL/L (ref 134–144)
T4 FREE SERPL-MCNC: 1.13 NG/DL (ref 0.82–1.77)
TRIGL SERPL-MCNC: 99 MG/DL (ref 0–149)
TSH SERPL DL<=0.005 MIU/L-ACNC: 2.9 UIU/ML (ref 0.45–4.5)
VLDLC SERPL CALC-MCNC: 20 MG/DL (ref 5–40)

## 2017-11-20 RX ORDER — ATENOLOL 25 MG/1
TABLET ORAL
Qty: 180 TAB | Refills: 2 | Status: SHIPPED | OUTPATIENT
Start: 2017-11-20 | End: 2018-08-17 | Stop reason: SDUPTHER

## 2017-11-20 RX ORDER — AMLODIPINE BESYLATE 10 MG/1
TABLET ORAL
Qty: 90 TAB | Refills: 2 | Status: SHIPPED | OUTPATIENT
Start: 2017-11-20 | End: 2018-08-17 | Stop reason: SDUPTHER

## 2017-12-19 RX ORDER — LOVASTATIN 10 MG/1
TABLET ORAL
Qty: 90 TAB | Refills: 3 | Status: SHIPPED | OUTPATIENT
Start: 2017-12-19 | End: 2018-12-16 | Stop reason: SDUPTHER

## 2018-02-19 RX ORDER — LISINOPRIL 40 MG/1
TABLET ORAL
Qty: 90 TAB | Refills: 2 | Status: SHIPPED | OUTPATIENT
Start: 2018-02-19 | End: 2018-11-18 | Stop reason: SDUPTHER

## 2018-08-24 ENCOUNTER — OFFICE VISIT (OUTPATIENT)
Dept: FAMILY MEDICINE CLINIC | Age: 63
End: 2018-08-24

## 2018-08-24 VITALS
HEIGHT: 66 IN | RESPIRATION RATE: 20 BRPM | SYSTOLIC BLOOD PRESSURE: 122 MMHG | WEIGHT: 239.6 LBS | OXYGEN SATURATION: 96 % | TEMPERATURE: 98 F | BODY MASS INDEX: 38.51 KG/M2 | HEART RATE: 60 BPM | DIASTOLIC BLOOD PRESSURE: 81 MMHG

## 2018-08-24 DIAGNOSIS — F17.200 SMOKER: ICD-10-CM

## 2018-08-24 DIAGNOSIS — F43.9 SITUATIONAL STRESS: ICD-10-CM

## 2018-08-24 DIAGNOSIS — E78.00 HYPERCHOLESTEROLEMIA: Chronic | ICD-10-CM

## 2018-08-24 DIAGNOSIS — R79.89 ELEVATED LFTS: Chronic | ICD-10-CM

## 2018-08-24 DIAGNOSIS — E03.9 HYPOTHYROIDISM, UNSPECIFIED TYPE: Chronic | ICD-10-CM

## 2018-08-24 DIAGNOSIS — R73.9 ELEVATED BLOOD SUGAR: Primary | Chronic | ICD-10-CM

## 2018-08-24 DIAGNOSIS — K75.81 NASH (NONALCOHOLIC STEATOHEPATITIS): Chronic | ICD-10-CM

## 2018-08-24 DIAGNOSIS — I10 ESSENTIAL HYPERTENSION: Chronic | ICD-10-CM

## 2018-08-24 PROBLEM — E66.01 SEVERE OBESITY (BMI 35.0-39.9): Status: ACTIVE | Noted: 2018-08-24

## 2018-08-24 NOTE — MR AVS SNAPSHOT
[address] [place] [address] 
[phone] Patient: María Isabel MRN: [id number] FVO:4/18/6310 Visit Information Date & Time Provider Department Dept. Phone Encounter #  
 8/24/2018  9:00 AM Izola Rubinstein, 7 Mike Carrillo [id number] Follow-up Instructions Return in about 3 months (around 11/24/2018). Upcoming Health Maintenance Date Due ZOSTER VACCINE AGE 60> 5/13/2016 PAP AKA CERVICAL CYTOLOGY 8/2/2016 BREAST CANCER SCRN MAMMOGRAM 8/26/2016 Influenza Age 5 to Adult 8/1/2018 COLONOSCOPY 11/19/2022 DTaP/Tdap/Td series (2 - Td) 1/27/2025 Allergies as of 8/24/2018  Review Complete On: 8/24/2018 By: Kenroy Davis LPN Severity Noted Reaction Type Reactions Pravachol [Pravastatin]  05/21/2010    Myalgia Zetia [Ezetimibe]  05/21/2010    Myalgia Current Immunizations  Reviewed on 11/18/2015 Name Date Influenza Vaccine 10/28/2013 Influenza Vaccine (Quad) PF 10/10/2017, 10/26/2016, 11/18/2015, 12/12/2014 Influenza Vaccine Split 10/12/2012, 2/11/2011 Pneumococcal Polysaccharide (PPSV-23) 7/19/2013 Tdap 1/27/2015 Not reviewed this visit You Were Diagnosed With   
  
 Codes Comments Elevated blood sugar    -  Primary ICD-10-CM: R73.9 ICD-9-CM: 790.29 ERICKSON (nonalcoholic steatohepatitis)     ICD-10-CM: Y93.69 ICD-9-CM: 571.8 Hypothyroidism, unspecified type     ICD-10-CM: E03.9 ICD-9-CM: 244.9 Essential hypertension     ICD-10-CM: I10 
ICD-9-CM: 401.9 Hypercholesterolemia     ICD-10-CM: E78.00 ICD-9-CM: 272.0 Elevated LFTs     ICD-10-CM: R79.89 ICD-9-CM: 790.6 Smoker     ICD-10-CM: Y84.570 ICD-9-CM: 305.1 Vitals BP Pulse Temp Resp Height(growth percentile) Weight(growth percentile)  122/81 (BP 1 Location: Left arm, BP Patient Position: Sitting) 60 98 °F (36.7 °C) (Oral) 20 5' 6\" (1.676 m) 239 lb 9.6 oz (108.7 kg) SpO2 BMI OB Status Smoking Status 96% 38.67 kg/m2 Postmenopausal Current Every Day Smoker Vitals History BMI and BSA Data Body Mass Index Body Surface Area  
 38.67 kg/m 2 2.25 m 2 Preferred Pharmacy Pharmacy Name Phone Diana Carreno, [facility] [phone] Your Updated Medication List  
  
   
This list is accurate as of 8/24/18  9:30 AM.  Always use your most recent med list.  
  
  
  
  
 albuterol 90 mcg/actuation inhaler Commonly known as:  PROVENTIL HFA, VENTOLIN HFA, PROAIR HFA Take 1 Puff by inhalation every four (4) hours as needed for Wheezing. amLODIPine 10 mg tablet Commonly known as:  Voncile Ellis TAKE 1 TABLET DAILY FOR HIGH BLOOD PRESSURE  
  
 aspirin delayed-release 81 mg tablet Take  by mouth daily. atenolol 25 mg tablet Commonly known as:  TENORMIN  
TAKE 1 TABLET TWICE A DAY CLARITIN 10 mg tablet Generic drug:  loratadine Take 10 mg by mouth daily. FISH OIL 1,200-144-216 mg Cap Generic drug:  fish oil-dha-epa Take  by mouth. hydroCHLOROthiazide 12.5 mg tablet Commonly known as:  HYDRODIURIL Take 1 Tab by mouth daily. levothyroxine 50 mcg tablet Commonly known as:  SYNTHROID  
TAKE 1 TABLET DAILY BEFORE BREAKFAST  
  
 lisinopril 40 mg tablet Commonly known as:  PRINIVIL, ZESTRIL  
TAKE 1 TABLET DAILY lovastatin 10 mg tablet Commonly known as:  MEVACOR  
TAKE 1 TABLET NIGHTLY  
  
 M-VIT PO Take  by mouth. ondansetron hcl 4 mg tablet Commonly known as:  ZOFRAN  
  
 raNITIdine 150 mg tablet Commonly known as:  ZANTAC  
  
 VITAMIN B-12 1,000 mcg tablet Generic drug:  cyanocobalamin Take 1,000 mcg by mouth daily. VITAMIN D3 1,000 unit tablet Generic drug:  cholecalciferol Take  by mouth daily. We Performed the Following CBC WITH AUTOMATED DIFF [82671 CPT(R)] HEMOGLOBIN A1C WITH EAG [90113 CPT(R)] LIPID PANEL [47787 CPT(R)] METABOLIC PANEL, COMPREHENSIVE [15515 CPT(R)] T4, FREE T609406 CPT(R)] TSH 3RD GENERATION [65471 CPT(R)] Follow-up Instructions Return in about 3 months (around 11/24/2018). Patient Instructions Learning About High Cholesterol What is high cholesterol? Cholesterol is a type of fat in your blood. It is needed for many body functions, such as making new cells. Cholesterol is made by your body. It also comes from food you eat. If you have too much cholesterol, it starts to build up in your arteries. This is called hardening of the arteries, or atherosclerosis. High cholesterol raises your risk of a heart attack and stroke. There are different types of cholesterol. LDL is the \"bad\" cholesterol. High LDL can raise your risk for heart disease, heart attack, and stroke. HDL is the \"good\" cholesterol. High HDL is linked with a lower risk for heart disease, heart attack, and stroke. Your cholesterol levels help your doctor find out your risk for having a heart attack or stroke. How can you prevent high cholesterol? A heart-healthy lifestyle can help you prevent high cholesterol. This lifestyle helps lower your risk for a heart attack and stroke. · Eat heart-healthy foods. ¨ Eat fruits, vegetables, whole grains (like oatmeal), dried beans and peas, nuts and seeds, soy products (like tofu), and fat-free or low-fat dairy products. ¨ Replace butter, margarine, and hydrogenated or partially hydrogenated oils with olive and canola oils. (Canola oil margarine without trans fat is fine.) ¨ Replace red meat with fish, poultry, and soy protein (like tofu). ¨ Limit processed and packaged foods like chips, crackers, and cookies. · Be active. Exercise can improve your cholesterol level. Get at least 30 minutes of exercise on most days of the week. Walking is a good choice. You also may want to do other activities, such as running, swimming, cycling, or playing tennis or team sports. · Stay at a healthy weight. Lose weight if you need to. · Don't smoke. If you need help quitting, talk to your doctor about stop-smoking programs and medicines. These can increase your chances of quitting for good. How is high cholesterol treated? The goal of treatment is to reduce your chances of having a heart attack or stroke. The goal is not to lower your cholesterol numbers only. · You may make lifestyle changes, such as eating healthy foods, not smoking, losing weight, and being more active. · You may have to take medicine. Follow-up care is a key part of your treatment and safety. Be sure to make and go to all appointments, and call your doctor if you are having problems. It's also a good idea to know your test results and keep a list of the medicines you take. Where can you learn more? Go to http://rhoda-maggie.info/. Enter Q345 in the search box to learn more about \"Learning About High Cholesterol. \" Current as of: May 10, 2017 Content Version: 11.7 © 5531-0084 ClearMyMail. Care instructions adapted under license by Meiyou (which disclaims liability or warranty for this information). If you have questions about a medical condition or this instruction, always ask your healthcare professional. [address] any warranty or liability for your use of this information. Introducing [facility]! Dear Anneliese Adan: Thank you for requesting a Smart Education account. Our records indicate that you already have an active Smart Education account. You can access your account anytime at [email or web address] Did you know that you can access your hospital and ER discharge instructions at any time in Smart Education? You can also review all of your test results from your hospital stay or ER visit. Additional Information If you have questions, please visit the Frequently Asked Questions section of the BellaDatit website at [email or web address] Remember, Appydrink is NOT to be used for urgent needs. For medical emergencies, dial 911. Now available from your iPhone and Android! Please provide this summary of care documentation to your next provider. Your primary care clinician is listed as [address]. If you have any questions after today's visit, please call [phone].

## 2018-08-24 NOTE — PROGRESS NOTES
Progress Note    Patient: Heydi Kholi MRN: [id number]  SSN: [id number]    YOB: 1955  Age: 59 y.o. Sex: female        Chief Complaint   Patient presents with    Complete Physical         Subjective:     Encounter Diagnoses   Name Primary?  Elevated blood sugar:  No sx of fulminant diabetes. No significant weight loss or gain. The patient realizes that without weight loss and exercise they are high risk for overt diabetes. Lab Results   Component Value Date/Time    Hemoglobin A1c 5.5 10/10/2017 11:32 AM     Lab Results   Component Value Date/Time    Glucose 103 (H) 10/10/2017 11:32 AM     Wt Readings from Last 3 Encounters:   08/24/18 239 lb 9.6 oz (108.7 kg)   10/10/17 227 lb (103 kg)   05/12/17 236 lb 5.3 oz (107.2 kg)     Body mass index is 38.67 kg/(m^2). Yes    ERICKSON (nonalcoholic steatohepatitis): Marisabel Rawls Lab Results   Component Value Date/Time    ALT (SGPT) 37 (H) 10/10/2017 11:32 AM    AST (SGOT) 34 10/10/2017 11:32 AM    Alk. phosphatase 71 10/10/2017 11:32 AM    Bilirubin, total 0.5 10/10/2017 11:32 AM            Hypothyroidism, unspecified type:  Lab Results   Component Value Date/Time    TSH 2.900 10/10/2017 11:32 AM    T4, Free 1.13 10/10/2017 11:32 AM      Denies fatigue, nervousness,weight changes, heat orcold intolerance, bowel changes,skin changes, cardiovascular symptoms, hair loss, feeling excessive energy, tremor, palpitations and weight loss. Thyroid medication has been unchanged since last medication check and labs.  Essential hypertension:  BP Readings from Last 3 Encounters:   08/24/18 122/81   10/10/17 131/80   05/12/17 135/70     The patient reports:  taking medications as instructed, no medication side effects noted, no TIA's, no chest pain on exertion, no dyspnea on exertion, no swelling of ankles.     Key CAD CHF Meds             atenolol (TENORMIN) 25 mg tablet  (Taking) TAKE 1 TABLET TWICE A DAY    amLODIPine (NORVASC) 10 mg tablet  (Taking) TAKE 1 TABLET DAILY FOR HIGH BLOOD PRESSURE    lisinopril (PRINIVIL, ZESTRIL) 40 mg tablet  (Taking) TAKE 1 TABLET DAILY    lovastatin (MEVACOR) 10 mg tablet  (Taking) TAKE 1 TABLET NIGHTLY    aspirin delayed-release 81 mg tablet  (Taking) Take  by mouth daily. hydrochlorothiazide (HYDRODIURIL) 12.5 mg tablet Take 1 Tab by mouth daily. Lab Results   Component Value Date/Time    Sodium 139 10/10/2017 11:32 AM    Potassium 4.8 10/10/2017 11:32 AM    Chloride 101 10/10/2017 11:32 AM    CO2 26 10/10/2017 11:32 AM    Anion gap 6 03/13/2016 06:32 PM    Glucose 103 (H) 10/10/2017 11:32 AM    BUN 8 10/10/2017 11:32 AM    Creatinine 0.56 (L) 10/10/2017 11:32 AM    BUN/Creatinine ratio 14 10/10/2017 11:32 AM    GFR est  10/10/2017 11:32 AM    GFR est non- 10/10/2017 11:32 AM    Calcium 9.2 10/10/2017 11:32 AM    Bilirubin, total 0.5 10/10/2017 11:32 AM    AST (SGOT) 34 10/10/2017 11:32 AM    Alk. phosphatase 71 10/10/2017 11:32 AM    Protein, total 6.7 10/10/2017 11:32 AM    Albumin 4.0 10/10/2017 11:32 AM    Globulin 4.2 (H) 03/13/2016 06:32 PM    A-G Ratio 1.5 10/10/2017 11:32 AM    ALT (SGPT) 37 (H) 10/10/2017 11:32 AM     Low salt diet? yes  Aerobic exercise?no  Our goal is to normalize the blood pressure to decrease the risks of strokes and heart attacks. The patient is in agreement with the plan.  Hypercholesterolemia:  Cardiovascular risks for her are: LDL goal is under 100. Key Antihyperlipidemia Meds             lovastatin (MEVACOR) 10 mg tablet  (Taking) TAKE 1 TABLET NIGHTLY        Lab Results   Component Value Date/Time    Cholesterol, total 206 (H) 10/10/2017 11:32 AM    HDL Cholesterol 58 10/10/2017 11:32 AM    LDL, calculated 128 (H) 10/10/2017 11:32 AM    Triglyceride 99 10/10/2017 11:32 AM    CHOL/HDL Ratio 4.2 12/04/2009 08:36 AM     Lab Results   Component Value Date/Time    ALT (SGPT) 37 (H) 10/10/2017 11:32 AM    AST (SGOT) 34 10/10/2017 11:32 AM    Alk.  phosphatase 71 10/10/2017 11:32 AM    Bilirubin, total 0.5 10/10/2017 11:32 AM      Myalgias: No   Fatigue: No   Other side effects: no  Wt Readings from Last 3 Encounters:   08/24/18 239 lb 9.6 oz (108.7 kg)   10/10/17 227 lb (103 kg)   05/12/17 236 lb 5.3 oz (107.2 kg)     The patient is aware of our goal to reduce or eliminate the long term problems (such as strokes and heart attacks) related to poorly controlled hyperlipidemia.  Elevated LFTs:  Lab Results   Component Value Date/Time    ALT (SGPT) 37 (H) 10/10/2017 11:32 AM    AST (SGOT) 34 10/10/2017 11:32 AM    Alk. phosphatase 71 10/10/2017 11:32 AM    Bilirubin, total 0.5 10/10/2017 11:32 AM          Smoker: she is smoking more due to stress.  Situational stress: her mother is very sick. She is now in assisted living. Mindfulness discussed along with stress reduction. Current and past medical information:    Current Medications after this visit[de-identified]   Current Outpatient Prescriptions   Medication Sig    atenolol (TENORMIN) 25 mg tablet TAKE 1 TABLET TWICE A DAY    amLODIPine (NORVASC) 10 mg tablet TAKE 1 TABLET DAILY FOR HIGH BLOOD PRESSURE    lisinopril (PRINIVIL, ZESTRIL) 40 mg tablet TAKE 1 TABLET DAILY    lovastatin (MEVACOR) 10 mg tablet TAKE 1 TABLET NIGHTLY    levothyroxine (SYNTHROID) 50 mcg tablet TAKE 1 TABLET DAILY BEFORE BREAKFAST    aspirin delayed-release 81 mg tablet Take  by mouth daily.  albuterol (PROVENTIL HFA, VENTOLIN HFA, PROAIR HFA) 90 mcg/actuation inhaler Take 1 Puff by inhalation every four (4) hours as needed for Wheezing.  PV W-O TESFAYE/FERROUS FUMARATE/FA (M-VIT PO) Take  by mouth.  loratadine (CLARITIN) 10 mg tablet Take 10 mg by mouth daily.  cholecalciferol, vitamin d3, (VITAMIN D) 1,000 unit tablet Take  by mouth daily.  cyanocobalamin (VITAMIN B-12) 1,000 mcg tablet Take 1,000 mcg by mouth daily.     ondansetron hcl (ZOFRAN) 4 mg tablet     raNITIdine (ZANTAC) 150 mg tablet     hydrochlorothiazide (HYDRODIURIL) 12.5 mg tablet Take 1 Tab by mouth daily.  fish oil-dha-epa (FISH OIL) 1,200-144-216 mg Cap Take  by mouth. No current facility-administered medications for this visit. Patient Active Problem List    Diagnosis Date Noted    Severe obesity (BMI 35.0-39.9) ([facility] [address] 08/24/2018    Elevated blood sugar 07/15/2015    Smoker 07/22/2013    ERICKSON (nonalcoholic steatohepatitis) 07/19/2013    Hypothyroidism 11/16/2012    Colon polyps 11/13/2012    Hypertension     Irritable bowel syndrome     Hypercholesterolemia     Dysmetabolic syndrome X     Elevated LFTs     Abnormal weight gain     Anxiety        Past Medical History:   Diagnosis Date    Abnormal LFT's     Abnormal weight gain     Anxiety     Depression     Dysmetabolic syndrome X     Hypercholesterolemia     Hypertension     Irritable bowel syndrome     Thyroid disease        Allergies   Allergen Reactions    Pravachol [Pravastatin] Myalgia    Zetia [Ezetimibe] Myalgia       Past Surgical History:   Procedure Laterality Date    HX CYST REMOVAL      neck    HX OTHER SURGICAL      DNC around 26years of age   Galina Bhatts OTHER SURGICAL      wisdom teeth extraction       Social History     Social History    Marital status:      Spouse name: N/A    Number of children: N/A    Years of education: N/A     Social History Main Topics    Smoking status: Current Every Day Smoker     Packs/day: 1.00     Years: 35.00    Smokeless tobacco: Never Used    Alcohol use Yes      Comment: 2 boxes of wine each week    Drug use: No    Sexual activity: Yes     Partners: Male     Other Topics Concern    None     Social History Narrative       Review of Systems   Constitutional: Negative. Negative for chills, fever, malaise/fatigue and weight loss. HENT: Negative. Negative for hearing loss. She has 2 broken teeth that need repair. Eyes: Negative. Negative for blurred vision and double vision.    Respiratory: Negative. Negative for cough, hemoptysis, sputum production and shortness of breath. Cardiovascular: Negative. Negative for chest pain, palpitations and orthopnea. Gastrointestinal: Negative. Negative for abdominal pain, blood in stool, heartburn, nausea and vomiting. Genitourinary: Negative. Negative for dysuria, frequency and urgency. Musculoskeletal: Negative. Negative for back pain, myalgias and neck pain. Skin: Negative. Negative for rash. Neurological: Negative. Negative for dizziness, tingling, tremors, weakness and headaches. Endo/Heme/Allergies: Negative. Psychiatric/Behavioral: Negative for depression. The patient is nervous/anxious. Severe life stresses- both at home and work. Denies depression. Objective:     Vitals:    08/24/18 0910   BP: 122/81   Pulse: 60   Resp: 20   Temp: 98 °F (36.7 °C)   TempSrc: Oral   SpO2: 96%   Weight: 239 lb 9.6 oz (108.7 kg)   Height: 5' 6\" (1.676 m)      Body mass index is 38.67 kg/(m^2). Physical Exam   Constitutional: She is oriented to person, place, and time and well-developed, well-nourished, and in no distress. No distress. HENT:   Head: Normocephalic and atraumatic. Mouth/Throat: Oropharynx is clear and moist.   Eyes: Conjunctivae are normal.   Neck: No tracheal deviation present. No thyromegaly present. Cardiovascular: Normal rate, regular rhythm and normal heart sounds. No murmur heard. Pulmonary/Chest: Effort normal and breath sounds normal. No respiratory distress. Despite heavy smoking or exam is normal.   Abdominal: Soft. She exhibits no distension. Lymphadenopathy:     She has no cervical adenopathy. Neurological: She is alert and oriented to person, place, and time. Skin: Skin is warm. No rash noted. She is not diaphoretic. No erythema. Psychiatric: Mood and affect normal.   Nursing note and vitals reviewed.         Health Maintenance Due   Topic Date Due    ZOSTER VACCINE AGE 60>  05/13/2016    PAP AKA CERVICAL CYTOLOGY  08/02/2016    BREAST CANCER SCRN MAMMOGRAM  08/26/2016    Influenza Age 9 to Adult  08/01/2018         Assessment and orders:     Encounter Diagnoses     ICD-10-CM ICD-9-CM   1. Elevated blood sugar R73.9 790.29   2. ERICKSON (nonalcoholic steatohepatitis) K75.81 571.8   3. Hypothyroidism, unspecified type E03.9 244.9   4. Essential hypertension I10 401.9   5. Hypercholesterolemia E78.00 272.0   6. Elevated LFTs R79.89 790.6   7. Smoker F17.200 305.1   8. Situational stress F43.9 V62.89     Diagnoses and all orders for this visit:    1. Elevated blood sugars-recheck. High risk of diabetes due to stress.  -     HEMOGLOBIN A1C WITH EAG  -     LIPID PANEL  -     METABOLIC PANEL, COMPREHENSIVE  -     T4, FREE  -     TSH 3RD GENERATION    2. ERICKSON (nonalcoholic steatohepatitis)-recheck  -     LIPID PANEL  -     METABOLIC PANEL, COMPREHENSIVE    3. Hypothyroidism, unspecified type-recheck  -     T4, FREE  -     TSH 3RD GENERATION    4. Essential hypertension-controlled  -     CBC WITH AUTOMATED DIFF    5. Hypercholesterolemia  -     LIPID PANEL  -     METABOLIC PANEL, COMPREHENSIVE    6. Elevated LFTs  -     METABOLIC PANEL, COMPREHENSIVE    7. Smoker-not realistic to have her stop until some of her stress diminishes. 8. Situational stress-counseling given            Plan of care:  Discussed diagnoses in detail with patient. Medication risks/benefits/side effects discussed with patient. All of the patient's questions were addressed. The patient understands and agrees with our plan of care. The patient knows to call back if they are unsure of or forget any changes we discussed today or if the symptoms change. The patient received an After-Visit Summary which contains VS, orders, medication list and allergy list. This can be used as a \"mini-medical record\" should they have to seek medical care while out of town.     Patient Care Team:  Mar Rosario MD as PCP - [place] [id number] Piysuh De Leon MD as Physician (Gastroenterology)  Clayton Kilpatrick MD (General Surgery)  Karen Moura, RN as Ambulatory Care Navigator    Follow-up Disposition:  Return in about 3 months (around 11/24/2018).     Future Appointments  Date Time Provider Vel Orr   11/27/2018 9:30 AM Johnnie Welsh MD [facility] SCHED       Signed By: Johnnie Welsh MD     August 24, 2018

## 2018-08-24 NOTE — PROGRESS NOTES
1. Have you been to the ER, urgent care clinic since your last visit? Hospitalized since your last visit? No    2. Have you seen or consulted any other health care providers outside of the [facility] since your last visit? Include any pap smears or colon screening.  No  Reviewed record in preparation for visit and have necessary documentation  Pt did not bring medication to office visit for review  Goals that were addressed and/or need to be completed during or after this appointment include     Health Maintenance Due   Topic Date Due    ZOSTER VACCINE AGE 60>  05/13/2016    PAP AKA CERVICAL CYTOLOGY  08/02/2016    BREAST CANCER SCRN MAMMOGRAM  08/26/2016    Influenza Age 9 to Adult  08/01/2018

## 2018-08-24 NOTE — PATIENT INSTRUCTIONS
Learning About High Cholesterol  What is high cholesterol? Cholesterol is a type of fat in your blood. It is needed for many body functions, such as making new cells. Cholesterol is made by your body. It also comes from food you eat. If you have too much cholesterol, it starts to build up in your arteries. This is called hardening of the arteries, or atherosclerosis. High cholesterol raises your risk of a heart attack and stroke. There are different types of cholesterol. LDL is the \"bad\" cholesterol. High LDL can raise your risk for heart disease, heart attack, and stroke. HDL is the \"good\" cholesterol. High HDL is linked with a lower risk for heart disease, heart attack, and stroke. Your cholesterol levels help your doctor find out your risk for having a heart attack or stroke. How can you prevent high cholesterol? A heart-healthy lifestyle can help you prevent high cholesterol. This lifestyle helps lower your risk for a heart attack and stroke. · Eat heart-healthy foods. ¨ Eat fruits, vegetables, whole grains (like oatmeal), dried beans and peas, nuts and seeds, soy products (like tofu), and fat-free or low-fat dairy products. ¨ Replace butter, margarine, and hydrogenated or partially hydrogenated oils with olive and canola oils. (Canola oil margarine without trans fat is fine.)  ¨ Replace red meat with fish, poultry, and soy protein (like tofu). ¨ Limit processed and packaged foods like chips, crackers, and cookies. · Be active. Exercise can improve your cholesterol level. Get at least 30 minutes of exercise on most days of the week. Walking is a good choice. You also may want to do other activities, such as running, swimming, cycling, or playing tennis or team sports. · Stay at a healthy weight. Lose weight if you need to. · Don't smoke. If you need help quitting, talk to your doctor about stop-smoking programs and medicines. These can increase your chances of quitting for good.   How is high cholesterol treated? The goal of treatment is to reduce your chances of having a heart attack or stroke. The goal is not to lower your cholesterol numbers only. · You may make lifestyle changes, such as eating healthy foods, not smoking, losing weight, and being more active. · You may have to take medicine. Follow-up care is a key part of your treatment and safety. Be sure to make and go to all appointments, and call your doctor if you are having problems. It's also a good idea to know your test results and keep a list of the medicines you take. Where can you learn more? Go to http://rhoda-maggie.info/. Enter G255 in the search box to learn more about \"Learning About High Cholesterol. \"  Current as of: May 10, 2017  Content Version: 11.7  © 2031-5472 [employer]. Care instructions adapted under license by [employer] (which disclaims liability or warranty for this information). If you have questions about a medical condition or this instruction, always ask your healthcare professional. [address] any warranty or liability for your use of this information.

## 2018-08-25 LAB
ALBUMIN SERPL-MCNC: 4.1 G/DL (ref 3.6–4.8)
ALBUMIN/GLOB SERPL: 1.4 {RATIO} (ref 1.2–2.2)
ALP SERPL-CCNC: 68 IU/L (ref 39–117)
ALT SERPL-CCNC: 43 IU/L (ref 0–32)
AST SERPL-CCNC: 40 IU/L (ref 0–40)
BASOPHILS # BLD AUTO: 0.1 X10E3/UL (ref 0–0.2)
BASOPHILS NFR BLD AUTO: 2 %
BILIRUB SERPL-MCNC: 0.5 MG/DL (ref 0–1.2)
BUN SERPL-MCNC: 15 MG/DL (ref 8–27)
BUN/CREAT SERPL: 23 (ref 12–28)
CALCIUM SERPL-MCNC: 9.3 MG/DL (ref 8.7–10.3)
CHLORIDE SERPL-SCNC: 101 MMOL/L (ref 96–106)
CHOLEST SERPL-MCNC: 179 MG/DL (ref 100–199)
CO2 SERPL-SCNC: 24 MMOL/L (ref 20–29)
CREAT SERPL-MCNC: 0.66 MG/DL (ref 0.57–1)
EOSINOPHIL # BLD AUTO: 0.2 X10E3/UL (ref 0–0.4)
EOSINOPHIL NFR BLD AUTO: 4 %
ERYTHROCYTE [DISTWIDTH] IN BLOOD BY AUTOMATED COUNT: 13 % (ref 12.3–15.4)
EST. AVERAGE GLUCOSE BLD GHB EST-MCNC: 114 MG/DL
GLOBULIN SER CALC-MCNC: 3 G/DL (ref 1.5–4.5)
GLUCOSE SERPL-MCNC: 109 MG/DL (ref 65–99)
HBA1C MFR BLD: 5.6 % (ref 4.8–5.6)
HCT VFR BLD AUTO: 41.7 % (ref 34–46.6)
HDLC SERPL-MCNC: 60 MG/DL
HGB BLD-MCNC: 14.2 G/DL (ref 11.1–15.9)
IMM GRANULOCYTES # BLD: 0 X10E3/UL (ref 0–0.1)
IMM GRANULOCYTES NFR BLD: 0 %
LDLC SERPL CALC-MCNC: 97 MG/DL (ref 0–99)
LYMPHOCYTES # BLD AUTO: 1.9 X10E3/UL (ref 0.7–3.1)
LYMPHOCYTES NFR BLD AUTO: 39 %
MCH RBC QN AUTO: 34.1 PG (ref 26.6–33)
MCHC RBC AUTO-ENTMCNC: 34.1 G/DL (ref 31.5–35.7)
MCV RBC AUTO: 100 FL (ref 79–97)
MONOCYTES # BLD AUTO: 0.6 X10E3/UL (ref 0.1–0.9)
MONOCYTES NFR BLD AUTO: 12 %
NEUTROPHILS # BLD AUTO: 2.2 X10E3/UL (ref 1.4–7)
NEUTROPHILS NFR BLD AUTO: 43 %
PLATELET # BLD AUTO: 218 X10E3/UL (ref 150–379)
POTASSIUM SERPL-SCNC: 4.6 MMOL/L (ref 3.5–5.2)
PROT SERPL-MCNC: 7.1 G/DL (ref 6–8.5)
RBC # BLD AUTO: 4.16 X10E6/UL (ref 3.77–5.28)
SODIUM SERPL-SCNC: 140 MMOL/L (ref 134–144)
T4 FREE SERPL-MCNC: 1.07 NG/DL (ref 0.82–1.77)
TRIGL SERPL-MCNC: 112 MG/DL (ref 0–149)
TSH SERPL DL<=0.005 MIU/L-ACNC: 2.64 UIU/ML (ref 0.45–4.5)
VLDLC SERPL CALC-MCNC: 22 MG/DL (ref 5–40)
WBC # BLD AUTO: 4.9 X10E3/UL (ref 3.4–10.8)

## 2018-08-27 ENCOUNTER — TELEPHONE (OUTPATIENT)
Dept: FAMILY MEDICINE CLINIC | Age: 63
End: 2018-08-27

## 2018-08-27 NOTE — TELEPHONE ENCOUNTER
Phone call to patient. Spoke with Jackqulyn Apley (on HIPAA) due to patient not being in at the time. Azell Rinne per Dr. Johny Goel:    \"Please find your most recent results below. Your labs are all stable but one of your liver tests remains elevated. I would like for you to have a liver ultrasound to see if you have fatty deposits in your liver. This can be relate to your elevated blood sugar. Please call with permission to schedule this. \"    Jackqulyn Apley states that he will give her the message and have her call us back. He reports that her mother passed away on Friday and that it may be around the first of the week before we hear back from her.

## 2018-09-03 RX ORDER — LEVOTHYROXINE SODIUM 50 UG/1
TABLET ORAL
Qty: 90 TAB | Refills: 3 | Status: SHIPPED | OUTPATIENT
Start: 2018-09-03 | End: 2019-09-01 | Stop reason: SDUPTHER

## 2018-09-28 DIAGNOSIS — R79.89 ELEVATED LIVER FUNCTION TESTS: Primary | ICD-10-CM

## 2018-09-28 NOTE — TELEPHONE ENCOUNTER
Pt called stating that she is in agreement with getting an ultrasound done. Pt prefer to have it done in [place].

## 2018-10-01 ENCOUNTER — TELEPHONE (OUTPATIENT)
Dept: FAMILY MEDICINE CLINIC | Age: 63
End: 2018-10-01

## 2018-10-01 DIAGNOSIS — R79.89 ELEVATED LIVER FUNCTION TESTS: ICD-10-CM

## 2018-10-03 ENCOUNTER — OFFICE VISIT (OUTPATIENT)
Dept: FAMILY MEDICINE CLINIC | Age: 63
End: 2018-10-03

## 2018-10-03 VITALS
DIASTOLIC BLOOD PRESSURE: 79 MMHG | TEMPERATURE: 98.3 F | HEIGHT: 66 IN | HEART RATE: 62 BPM | WEIGHT: 243.6 LBS | BODY MASS INDEX: 39.15 KG/M2 | RESPIRATION RATE: 18 BRPM | OXYGEN SATURATION: 96 % | SYSTOLIC BLOOD PRESSURE: 119 MMHG

## 2018-10-03 DIAGNOSIS — R79.89 ELEVATED LFTS: ICD-10-CM

## 2018-10-03 DIAGNOSIS — J45.21 REACTIVE AIRWAY DISEASE, MILD INTERMITTENT, WITH ACUTE EXACERBATION: ICD-10-CM

## 2018-10-03 DIAGNOSIS — F17.200 SMOKER: ICD-10-CM

## 2018-10-03 DIAGNOSIS — Z23 ENCOUNTER FOR IMMUNIZATION: ICD-10-CM

## 2018-10-03 DIAGNOSIS — J45.21 MILD INTERMITTENT ASTHMATIC BRONCHITIS WITH ACUTE EXACERBATION: Primary | ICD-10-CM

## 2018-10-03 DIAGNOSIS — I10 ESSENTIAL HYPERTENSION: Chronic | ICD-10-CM

## 2018-10-03 RX ORDER — ALBUTEROL SULFATE 90 UG/1
1 AEROSOL, METERED RESPIRATORY (INHALATION)
Qty: 1 INHALER | Refills: 5 | Status: SHIPPED | OUTPATIENT
Start: 2018-10-03 | End: 2019-11-07 | Stop reason: SDUPTHER

## 2018-10-03 RX ORDER — ALBUTEROL SULFATE 90 UG/1
1 AEROSOL, METERED RESPIRATORY (INHALATION)
Qty: 1 INHALER | Refills: 5 | Status: SHIPPED | OUTPATIENT
Start: 2018-10-03 | End: 2018-10-03 | Stop reason: SDUPTHER

## 2018-10-03 NOTE — PROGRESS NOTES
Progress Note    Patient: Omid Mcguire MRN: [id number]  SSN: [id number]    YOB: 1955  Age: 59 y.o. Sex: female        Chief Complaint   Patient presents with    Cough     Dry cough for several months         Subjective:     Encounter Diagnoses   Name Primary?  Mild intermittent asthmatic bronchitis with acute exacerbation:  Has been coughing x 1 moth. It is wheezy. Cough is non-productive. She was smoking more lately due to death of her Mom. Yes    Reactive airway disease, mild intermittent, with acute exacerbation. Her PF is 250-300 only. [place] weeds and mold. She is known to have asthma previously. This may be a combination of COPD and allergic asthma starting.  Essential hypertension:  BP Readings from Last 3 Encounters:   10/03/18 119/79   08/24/18 122/81   10/10/17 131/80     The patient reports:  taking medications as instructed, no medication side effects noted, no TIA's, no chest pain on exertion, no dyspnea on exertion, no swelling of ankles. Key CAD CHF Meds             atenolol (TENORMIN) 25 mg tablet  (Taking) TAKE 1 TABLET TWICE A DAY    amLODIPine (NORVASC) 10 mg tablet  (Taking) TAKE 1 TABLET DAILY FOR HIGH BLOOD PRESSURE    lisinopril (PRINIVIL, ZESTRIL) 40 mg tablet  (Taking) TAKE 1 TABLET DAILY    lovastatin (MEVACOR) 10 mg tablet  (Taking) TAKE 1 TABLET NIGHTLY    aspirin delayed-release 81 mg tablet  (Taking) Take  by mouth daily. hydrochlorothiazide (HYDRODIURIL) 12.5 mg tablet Take 1 Tab by mouth daily.            Lab Results   Component Value Date/Time    Sodium 140 08/24/2018 04:56 PM    Potassium 4.6 08/24/2018 04:56 PM    Chloride 101 08/24/2018 04:56 PM    CO2 24 08/24/2018 04:56 PM    Anion gap 6 03/13/2016 06:32 PM    Glucose 109 (H) 08/24/2018 04:56 PM    BUN 15 08/24/2018 04:56 PM    Creatinine 0.66 08/24/2018 04:56 PM    BUN/Creatinine ratio 23 08/24/2018 04:56 PM    GFR est  08/24/2018 04:56 PM GFR est non-AA 95 08/24/2018 04:56 PM    Calcium 9.3 08/24/2018 04:56 PM    Bilirubin, total 0.5 08/24/2018 04:56 PM    AST (SGOT) 40 08/24/2018 04:56 PM    Alk. phosphatase 68 08/24/2018 04:56 PM    Protein, total 7.1 08/24/2018 04:56 PM    Albumin 4.1 08/24/2018 04:56 PM    Globulin 4.2 (H) 03/13/2016 06:32 PM    A-G Ratio 1.4 08/24/2018 04:56 PM    ALT (SGPT) 43 (H) 08/24/2018 04:56 PM     Low salt diet-no  Aerobic exercise? no  Our goal is to normalize the blood pressure to decrease the risks of strokes and heart attacks. The patient is in agreement with the plan.  Smoker: 2/3 ppd curently but plans to switch to Vaping.  Elevated LFTs: she has a liver ultrasound scheduled soon.  Encounter for immunization: Flu vaccine done today. Current and past medical information:    Current Medications after this visit[de-identified]   Current Outpatient Prescriptions   Medication Sig    albuterol (PROVENTIL HFA, VENTOLIN HFA, PROAIR HFA) 90 mcg/actuation inhaler Take 1 Puff by inhalation every six (6) hours as needed for Wheezing.  levothyroxine (SYNTHROID) 50 mcg tablet TAKE 1 TABLET DAILY BEFORE BREAKFAST    atenolol (TENORMIN) 25 mg tablet TAKE 1 TABLET TWICE A DAY    amLODIPine (NORVASC) 10 mg tablet TAKE 1 TABLET DAILY FOR HIGH BLOOD PRESSURE    lisinopril (PRINIVIL, ZESTRIL) 40 mg tablet TAKE 1 TABLET DAILY    lovastatin (MEVACOR) 10 mg tablet TAKE 1 TABLET NIGHTLY    aspirin delayed-release 81 mg tablet Take  by mouth daily.  PV W-O TESFAYE/FERROUS FUMARATE/FA (M-VIT PO) Take  by mouth.  loratadine (CLARITIN) 10 mg tablet Take 10 mg by mouth daily.  cholecalciferol, vitamin d3, (VITAMIN D) 1,000 unit tablet Take  by mouth daily.  cyanocobalamin (VITAMIN B-12) 1,000 mcg tablet Take 1,000 mcg by mouth daily.  ondansetron hcl (ZOFRAN) 4 mg tablet     raNITIdine (ZANTAC) 150 mg tablet     hydrochlorothiazide (HYDRODIURIL) 12.5 mg tablet Take 1 Tab by mouth daily.     fish oil-dha-epa (FISH OIL) 1,200-144-216 mg Cap Take  by mouth. No current facility-administered medications for this visit. Patient Active Problem List    Diagnosis Date Noted    Severe obesity (BMI 35.0-39.9) 08/24/2018    Elevated blood sugar 07/15/2015    Smoker 07/22/2013    ERICKSON (nonalcoholic steatohepatitis) 07/19/2013    Hypothyroidism 11/16/2012    Colon polyps 11/13/2012    Hypertension     Irritable bowel syndrome     Hypercholesterolemia     Dysmetabolic syndrome X     Elevated LFTs     Abnormal weight gain     Anxiety        Past Medical History:   Diagnosis Date    Abnormal LFT's     Abnormal weight gain     Anxiety     Depression     Dysmetabolic syndrome X     Hypercholesterolemia     Hypertension     Irritable bowel syndrome     Thyroid disease        Allergies   Allergen Reactions    Pravachol [Pravastatin] Myalgia    Zetia [Ezetimibe] Myalgia       Past Surgical History:   Procedure Laterality Date    HX CYST REMOVAL      neck    HX OTHER SURGICAL      DNC around 26years of age   Ricardo Bonilla OTHER SURGICAL      wisdom teeth extraction       Social History     Social History    Marital status:      Spouse name: N/A    Number of children: N/A    Years of education: N/A     Social History Main Topics    Smoking status: Current Every Day Smoker     Packs/day: 1.00     Years: 35.00    Smokeless tobacco: Never Used    Alcohol use Yes      Comment: 2 boxes of wine each week    Drug use: No    Sexual activity: Yes     Partners: Male     Other Topics Concern    None     Social History Narrative       Review of Systems   Constitutional: Negative. Negative for chills, fever, malaise/fatigue and weight loss. HENT: Negative. Negative for hearing loss. Eyes: Negative. Negative for blurred vision and double vision. Respiratory: Positive for cough and wheezing. Negative for hemoptysis, sputum production and shortness of breath.     Cardiovascular: Negative. Negative for chest pain, palpitations and orthopnea. Gastrointestinal: Negative. Negative for heartburn. Genitourinary: Negative. Musculoskeletal: Negative. Skin: Negative. Negative for rash. Neurological: Negative. Negative for weakness. Endo/Heme/Allergies: Negative. Psychiatric/Behavioral: Negative. Negative for depression. Objective:     Vitals:    10/03/18 0959   BP: 119/79   Pulse: 62   Resp: 18   Temp: 98.3 °F (36.8 °C)   TempSrc: Oral   SpO2: 96%   Weight: 243 lb 9.6 oz (110.5 kg)   Height: 5' 6\" (1.676 m)      Body mass index is 39.32 kg/(m^2). Physical Exam   Constitutional: She is oriented to person, place, and time and well-developed, well-nourished, and in no distress. No distress. HENT:   Head: Normocephalic and atraumatic. Mouth/Throat: Oropharynx is clear and moist.   Eyes: Conjunctivae are normal.   Neck: No thyromegaly present. Cardiovascular: Normal rate, regular rhythm and normal heart sounds. No murmur heard. Pulmonary/Chest: Effort normal and breath sounds normal. No respiratory distress. Neurological: She is alert and oriented to person, place, and time. Skin: Skin is warm. She is not diaphoretic. Psychiatric: Mood and affect normal.   Nursing note and vitals reviewed. Health Maintenance Due   Topic Date Due    Shingrix Vaccine Age 49> (1 of 2)-given letter for pharmacy 07/13/2006    PAP AKA CERVICAL CYTOLOGY  08/02/2016    BREAST CANCER SCRN MAMMOGRAM  08/26/2016    Influenza Age 9 to [facility] today 08/01/2018         Assessment and orders:     Encounter Diagnoses     ICD-10-CM ICD-9-CM   1. Mild intermittent asthmatic bronchitis with acute exacerbation J45.21 493.92   2. Reactive airway disease, mild intermittent, with acute exacerbation J45.21 493.92   3. Essential hypertension I10 401.9   4. Smoker F17.200 305.1   5. Elevated LFTs R94.5 790.6   6.  Encounter for immunization Z23 V03.89     Diagnoses and all orders for this visit:    1. Mild intermittent asthmatic bronchitis with acute exacerbation  -     XR CHEST PA LAT; Future    2. Reactive airway disease, mild intermittent, with acute exacerbation  -     albuterol (PROVENTIL HFA, VENTOLIN HFA, PROAIR HFA) 90 mcg/actuation inhaler; Take 1 Puff by inhalation every six (6) hours as needed for Wheezing.  -     XR CHEST PA LAT; Future    3. Essential hypertension-controlled    4. Smoker-discussed smoking cessation. Warned her regarding nicotine content of vaping    5. Elevated LFTs  -    Ultrasound liver in the future. 6. Encounter for immunization  -     ID IMMUNIZ ADMIN,1 SINGLE/COMB VAC/TOXOID  -     INFLUENZA VIRUS VACCINE QUADRIVALENT, PRESERVATIVE FREE SYRINGE ([id number])            Plan of care:  Discussed diagnoses in detail with patient. Medication risks/benefits/side effects discussed with patient. All of the patient's questions were addressed. The patient understands and agrees with our plan of care. The patient knows to call back if they are unsure of or forget any changes we discussed today or if the symptoms change. The patient received an After-Visit Summary which contains VS, orders, medication list and allergy list. This can be used as a \"mini-medical record\" should they have to seek medical care while out of town. Patient Care Team:  Godwin Rivera MD as PCP - Wilfrid Coreas MD as Physician (Gastroenterology)  Eileen Granda MD (General Surgery)  Kay Giron RN as Ambulatory Care Navigator    Follow-up Disposition:  Return for has appt. .    Future Appointments  Date Time Provider Vel Dominga   10/6/2018 9:00 AM [address]   11/27/2018 9:30 AM Godwin Rivera MD [facility] KARLI SCHED       Signed By: Godwin Rivera MD     October 3, 2018

## 2018-10-03 NOTE — MR AVS SNAPSHOT
[facility] 
 
 
 [address] [id number] [phone] Patient: Satish Mcgarry MRN: [id number] Visit Information Date & Time Provider Department Dept. Phone Encounter #  
 10/3/2018  9:55 AM Cristobal Flores  [facility] [phone] [id number] Your Appointments 11/27/2018  9:30 AM  
ROUTINE CARE with Cristobal Flores MD  
[address] [facility] Francee Saas) Appt Note: 3 mnth fu est pt HTN  
 [address] [address]  
[place] [address] [place] [address] [address] [address] Upcoming Health Maintenance Date Due Shingrix Vaccine Age 50> (1 of 2) 7/13/2006 PAP AKA CERVICAL CYTOLOGY 8/2/2016 BREAST CANCER SCRN MAMMOGRAM 8/26/2016 Influenza Age 5 to Adult 8/1/2018 COLONOSCOPY 11/19/2022 DTaP/Tdap/Td series (2 - Td) 1/27/2025 Allergies as of 10/3/2018  Review Complete On: 10/3/2018 By: Uche Cho LPN Severity Noted Reaction Type Reactions Pravachol [Pravastatin]  05/21/2010    Myalgia Zetia [Ezetimibe]  05/21/2010    Myalgia Current Immunizations  Reviewed on 11/18/2015 Name Date Influenza Vaccine 10/28/2013 Influenza Vaccine (Quad) PF 10/10/2017, 10/26/2016, 11/18/2015, 12/12/2014 Influenza Vaccine Split 10/12/2012, 2/11/2011 Pneumococcal Polysaccharide (PPSV-23) 7/19/2013 Tdap 1/27/2015 Not reviewed this visit You Were Diagnosed With   
  
 Codes Comments Mild intermittent asthmatic bronchitis with acute exacerbation    -  Primary ICD-10-CM: J45.21 ICD-9-CM: 117.14 Reactive airway disease, mild intermittent, with acute exacerbation     ICD-10-CM: J45.21 ICD-9-CM: 312.10 Vitals BP Pulse Temp Resp Height(growth percentile) Weight(growth percentile)  119/79 62 98.3 °F (36.8 °C) (Oral) 18 5' 6\" (1.676 m) 243 lb 9.6 oz (110.5 kg) SpO2 BMI OB Status Smoking Status 96% 39.32 kg/m2 Postmenopausal Current Every Day Smoker Vitals History BMI and BSA Data Body Mass Index Body Surface Area  
 39.32 kg/m 2 2.27 m 2 Preferred Pharmacy Pharmacy Name Phone [address] [place] [address], [facility] [place] [phone] Your Updated Medication List  
  
   
This list is accurate as of 10/3/18 10:21 AM.  Always use your most recent med list.  
  
  
  
  
 albuterol 90 mcg/actuation inhaler Commonly known as:  PROVENTIL HFA, VENTOLIN HFA, PROAIR HFA Take 1 Puff by inhalation every six (6) hours as needed for Wheezing. amLODIPine 10 mg tablet Commonly known as:  Herschell Saras TAKE 1 TABLET DAILY FOR HIGH BLOOD PRESSURE  
  
 aspirin delayed-release 81 mg tablet Take  by mouth daily. atenolol 25 mg tablet Commonly known as:  TENORMIN  
TAKE 1 TABLET TWICE A DAY CLARITIN 10 mg tablet Generic drug:  loratadine Take 10 mg by mouth daily. FISH OIL 1,200-144-216 mg Cap Generic drug:  fish oil-dha-epa Take  by mouth. hydroCHLOROthiazide 12.5 mg tablet Commonly known as:  HYDRODIURIL Take 1 Tab by mouth daily. levothyroxine 50 mcg tablet Commonly known as:  SYNTHROID  
TAKE 1 TABLET DAILY BEFORE BREAKFAST  
  
 lisinopril 40 mg tablet Commonly known as:  PRINIVIL, ZESTRIL  
TAKE 1 TABLET DAILY lovastatin 10 mg tablet Commonly known as:  MEVACOR  
TAKE 1 TABLET NIGHTLY  
  
 M-VIT PO Take  by mouth. ondansetron hcl 4 mg tablet Commonly known as:  ZOFRAN  
  
 raNITIdine 150 mg tablet Commonly known as:  ZANTAC  
  
 VITAMIN B-12 1,000 mcg tablet Generic drug:  cyanocobalamin Take 1,000 mcg by mouth daily. VITAMIN D3 1,000 unit tablet Generic drug:  cholecalciferol Take  by mouth daily. Prescriptions Sent to Pharmacy Refills albuterol (PROVENTIL HFA, VENTOLIN HFA, PROAIR HFA) 90 mcg/actuation inhaler 5 Sig: Take 1 Puff by inhalation every six (6) hours as needed for Wheezing. Class: Normal  
 Pharmacy: [address] #: [phone] Route: Inhalation To-Do List   
 10/06/2018 9:00 AM  
  Appointment with Lauren Lofton at [facility] Ultrasound ([phone]) General  NPO DIET RESTICTIONS Please be NPO (nothing by mouth) for 6- 8 hours prior to procedure. GENERAL INSTRUCTIONS 1. Bring any non [facility] facility films/reports pertaining to the area being studied with you on the day of appointment. 2. A written order with a valid diagnosis and Physicians signature is required for all scheduled tests. 3. Check in at registration 30 minutes before your appointment time unless you were instructed to do otherwise. Patient Instructions Monitor Peak Flows. Return readings to me. Influenza (Flu) Vaccine (Inactivated or Recombinant): What You Need to Know Why get vaccinated? Influenza (\"flu\") is a contagious disease that spreads around the [place] every winter, usually between October and May. Flu is caused by influenza viruses and is spread mainly by coughing, sneezing, and close contact. Anyone can get flu. Flu strikes suddenly and can last several days. Symptoms vary by age, but can include: · Fever/chills. · Sore throat. · Muscle aches. · Fatigue. · Cough. · Headache. · Runny or stuffy nose. Flu can also lead to pneumonia and blood infections, and cause diarrhea and seizures in children. If you have a medical condition, such as heart or lung disease, flu can make it worse. Flu is more dangerous for some people. Infants and young children, people 72years of age and older, pregnant women, and people with certain health conditions or a weakened immune system are at greatest risk. Each year thousands of people in the [facility] die from flu, and many more are hospitalized. Flu vaccine can: · Keep you from getting flu. · Make flu less severe if you do get it. · Keep you from spreading flu to your family and other people. Inactivated and recombinant flu vaccines A dose of flu vaccine is recommended every flu season. Children 6 months through 6years of age may need two doses during the same flu season. Everyone else needs only one dose each flu season. Some inactivated flu vaccines contain a very small amount of a mercury-based preservative called thimerosal. Studies have not shown thimerosal in vaccines to be harmful, but flu vaccines that do not contain thimerosal are available. There is no live flu virus in flu shots. They cannot cause the flu. There are many flu viruses, and they are always changing. Each year a new flu vaccine is made to protect against three or four viruses that are likely to cause disease in the upcoming flu season. But even when the vaccine doesn't exactly match these viruses, it may still provide some protection. Flu vaccine cannot prevent: · Flu that is caused by a virus not covered by the vaccine. · Illnesses that look like flu but are not. Some people should not get this vaccine Tell the person who is giving you the vaccine: · If you have any severe (life-threatening) allergies. If you ever had a life-threatening allergic reaction after a dose of flu vaccine, or have a severe allergy to any part of this vaccine, you may be advised not to get vaccinated. Most, but not all, types of flu vaccine contain a small amount of egg protein. · If you ever had Guillain-Barré syndrome (also called GBS) Some people with a history of GBS should not get this vaccine. This should be discussed with your doctor. · If you are not feeling well.  It is usually okay to get flu vaccine when you have a mild illness, but you might be asked to come back when you feel better. Risks of a vaccine reaction With any medicine, including vaccines, there is a chance of reactions. These are usually mild and go away on their own, but serious reactions are also possible. Most people who get a flu shot do not have any problems with it. Minor problems following a flu shot include: · Soreness, redness, or swelling where the shot was given · Hoarseness · Sore, red or itchy eyes · Cough · Fever · Aches · Headache · Itching · Fatigue If these problems occur, they usually begin soon after the shot and last 1 or 2 days. More serious problems following a flu shot can include the following: · There may be a small increased risk of Guillain-Barré Syndrome (GBS) after inactivated flu vaccine. This risk has been estimated at 1 or 2 additional cases per million people vaccinated. This is much lower than the risk of severe complications from flu, which can be prevented by flu vaccine. · Esmeralda Dakin children who get the flu shot along with pneumococcal vaccine (PCV13) and/or DTaP vaccine at the same time might be slightly more likely to have a seizure caused by fever. Ask your doctor for more information. Tell your doctor if a child who is getting flu vaccine has ever had a seizure Problems that could happen after any injected vaccine: · People sometimes faint after a medical procedure, including vaccination. Sitting or lying down for about 15 minutes can help prevent fainting, and injuries caused by a fall. Tell your doctor if you feel dizzy, or have vision changes or ringing in the ears. · Some people get severe pain in the shoulder and have difficulty moving the arm where a shot was given. This happens very rarely. · Any medication can cause a severe allergic reaction. Such reactions from a vaccine are very rare, estimated at about 1 in a million doses, and would happen within a few minutes to a few hours after the vaccination. As with any medicine, there is a very remote chance of a vaccine causing a serious injury or death. The safety of vaccines is always being monitored. For more information, visit: www.cdc.gov/vaccinesafety/. What if there is a serious reaction? What should I look for? · Look for anything that concerns you, such as signs of a severe allergic reaction, very high fever, or unusual behavior. Signs of a severe allergic reaction can include hives, swelling of the face and throat, difficulty breathing, a fast heartbeat, dizziness, and weakness - usually within a few minutes to a few hours after the vaccination. What should I do? · If you think it is a severe allergic reaction or other emergency that can't wait, call 9-1-1 and get the person to the nearest hospital. Otherwise, call your doctor. · Reactions should be reported to the \"Vaccine Adverse Event Reporting System\" (VAERS). Your doctor should file this report, or you can do it yourself through the VAERS website at www.vaers. [facility].gov, or by calling 2-326.609.9165. VAERS does not give medical advice. The National Vaccine Injury Compensation Program 
The National Vaccine Injury Compensation Program (VICP) is a federal program that was created to compensate people who may have been injured by certain vaccines. Persons who believe they may have been injured by a vaccine can learn about the program and about filing a claim by calling 1-440.186.6893 or visiting the [employer] website at www.[facility].gov/vaccinecompensation. There is a time limit to file a claim for compensation. How can I learn more? · Ask your healthcare provider. He or she can give you the vaccine package insert or suggest other sources of information. · Call your local or state health department. · Contact the Centers for Disease Control and Prevention (CDC): 
¨ Call 5-350.165.6380 (1-800-CDC-INFO) or ¨ Visit CDC's website at www.cdc.gov/flu Vaccine Information Statement Inactivated Influenza Vaccine 8/7/2015) [address] Lin [id number]-33 [facility] [employer] Centers for Disease Control and Prevention Many Vaccine Information Statements are available in Sami and other languages. See www.immunize.org/vis. Muchas hojas de información sobre vacunas están disponibles en español y en otros idiomas. Visite www.immunize.org/vis. Care instructions adapted under license by [employer] (which disclaims liability or warranty for this information). If you have questions about a medical condition or this instruction, always ask your healthcare professional. [place] [address] any warranty or liability for your use of this information. Introducing [facility]! Dear Anjali Hanks: Thank you for requesting a light account. Our records indicate that you already have an active light account. You can access your account anytime at [email or web address]. [email or web address] Did you know that you can access your hospital and ER discharge instructions at any time in light? You can also review all of your test results from your hospital stay or ER visit. Additional Information If you have questions, please visit the Frequently Asked Questions section of the light website at [email or web address]/. Remember, light is NOT to be used for urgent needs. For medical emergencies, dial 911. Now available from your iPhone and Android! Please provide this summary of care documentation to your next provider. Your primary care clinician is listed as Π[address] 90. If you have any questions after today's visit, please call [phone].

## 2018-10-03 NOTE — PATIENT INSTRUCTIONS
Monitor Peak Flows. Return readings to me. Influenza (Flu) Vaccine (Inactivated or Recombinant): What You Need to Know  Why get vaccinated? Influenza (\"flu\") is a contagious disease that spreads around the [place] every winter, usually between October and May. Flu is caused by influenza viruses and is spread mainly by coughing, sneezing, and close contact. Anyone can get flu. Flu strikes suddenly and can last several days. Symptoms vary by age, but can include:  · Fever/chills. · Sore throat. · Muscle aches. · Fatigue. · Cough. · Headache. · Runny or stuffy nose. Flu can also lead to pneumonia and blood infections, and cause diarrhea and seizures in children. If you have a medical condition, such as heart or lung disease, flu can make it worse. Flu is more dangerous for some people. Infants and young children, people 72years of age and older, pregnant women, and people with certain health conditions or a weakened immune system are at greatest risk. Each year thousands of people in the [facility] die from flu, and many more are hospitalized. Flu vaccine can:  · Keep you from getting flu. · Make flu less severe if you do get it. · Keep you from spreading flu to your family and other people. Inactivated and recombinant flu vaccines  A dose of flu vaccine is recommended every flu season. Children 6 months through 6years of age may need two doses during the same flu season. Everyone else needs only one dose each flu season. Some inactivated flu vaccines contain a very small amount of a mercury-based preservative called thimerosal. Studies have not shown thimerosal in vaccines to be harmful, but flu vaccines that do not contain thimerosal are available. There is no live flu virus in flu shots. They cannot cause the flu. There are many flu viruses, and they are always changing.  Each year a new flu vaccine is made to protect against three or four viruses that are likely to cause disease in the upcoming flu season. But even when the vaccine doesn't exactly match these viruses, it may still provide some protection. Flu vaccine cannot prevent:  · Flu that is caused by a virus not covered by the vaccine. · Illnesses that look like flu but are not. Some people should not get this vaccine  Tell the person who is giving you the vaccine:  · If you have any severe (life-threatening) allergies. If you ever had a life-threatening allergic reaction after a dose of flu vaccine, or have a severe allergy to any part of this vaccine, you may be advised not to get vaccinated. Most, but not all, types of flu vaccine contain a small amount of egg protein. · If you ever had Guillain-Barré syndrome (also called GBS) Some people with a history of GBS should not get this vaccine. This should be discussed with your doctor. · If you are not feeling well. It is usually okay to get flu vaccine when you have a mild illness, but you might be asked to come back when you feel better. Risks of a vaccine reaction  With any medicine, including vaccines, there is a chance of reactions. These are usually mild and go away on their own, but serious reactions are also possible. Most people who get a flu shot do not have any problems with it. Minor problems following a flu shot include:  · Soreness, redness, or swelling where the shot was given  · Hoarseness  · Sore, red or itchy eyes  · Cough  · Fever  · Aches  · Headache  · Itching  · Fatigue  If these problems occur, they usually begin soon after the shot and last 1 or 2 days. More serious problems following a flu shot can include the following:  · There may be a small increased risk of Guillain-Barré Syndrome (GBS) after inactivated flu vaccine. This risk has been estimated at 1 or 2 additional cases per million people vaccinated. This is much lower than the risk of severe complications from flu, which can be prevented by flu vaccine.   · Keisha Garciatocks children who get the flu shot along with pneumococcal vaccine (PCV13) and/or DTaP vaccine at the same time might be slightly more likely to have a seizure caused by fever. Ask your doctor for more information. Tell your doctor if a child who is getting flu vaccine has ever had a seizure  Problems that could happen after any injected vaccine:  · People sometimes faint after a medical procedure, including vaccination. Sitting or lying down for about 15 minutes can help prevent fainting, and injuries caused by a fall. Tell your doctor if you feel dizzy, or have vision changes or ringing in the ears. · Some people get severe pain in the shoulder and have difficulty moving the arm where a shot was given. This happens very rarely. · Any medication can cause a severe allergic reaction. Such reactions from a vaccine are very rare, estimated at about 1 in a million doses, and would happen within a few minutes to a few hours after the vaccination. As with any medicine, there is a very remote chance of a vaccine causing a serious injury or death. The safety of vaccines is always being monitored. For more information, visit: www.cdc.gov/vaccinesafety/. What if there is a serious reaction? What should I look for? · Look for anything that concerns you, such as signs of a severe allergic reaction, very high fever, or unusual behavior. Signs of a severe allergic reaction can include hives, swelling of the face and throat, difficulty breathing, a fast heartbeat, dizziness, and weakness - usually within a few minutes to a few hours after the vaccination. What should I do? · If you think it is a severe allergic reaction or other emergency that can't wait, call 9-1-1 and get the person to the nearest hospital. Otherwise, call your doctor. · Reactions should be reported to the \"Vaccine Adverse Event Reporting System\" (VAERS). Your doctor should file this report, or you can do it yourself through the VAERS website at www.vaers. [facility].gov, or by calling 6-096-599-507-906-7326. [facility] does not give medical advice. The National Vaccine Injury Compensation Program  The National Vaccine Injury Compensation Program (VICP) is a federal program that was created to compensate people who may have been injured by certain vaccines. Persons who believe they may have been injured by a vaccine can learn about the program and about filing a claim by calling 5-649.600.4012 or visiting the [employer] website at www.[facility].gov/vaccinecompensation. There is a time limit to file a claim for compensation. How can I learn more? · Ask your healthcare provider. He or she can give you the vaccine package insert or suggest other sources of information. · Call your local or state health department. · Contact the Centers for Disease Control and Prevention (CDC):  ¨ Call 0-724.821.9155 (1-800-CDC-INFO) or  ¨ Visit CDC's website at www.cdc.gov/flu  Vaccine Information Statement  Inactivated Influenza Vaccine  8/7/2015)  42 ANNMary Ann Pagan Didiergarret 013ET-02  Department of Health and Human Services  Centers for Disease Control and Prevention  Many Vaccine Information Statements are available in [place] and other languages. See www.immunize.org/vis. Muchas hojas de información sobre vacunas están disponibles en español y en otros idiomas. Visite www.immunize.org/vis. Care instructions adapted under license by [employer] (which disclaims liability or warranty for this information). If you have questions about a medical condition or this instruction, always ask your healthcare professional. [place] [address] any warranty or liability for your use of this information.

## 2018-10-05 ENCOUNTER — TELEPHONE (OUTPATIENT)
Dept: FAMILY MEDICINE CLINIC | Age: 63
End: 2018-10-05

## 2018-10-05 NOTE — TELEPHONE ENCOUNTER
Unable to reach patient by telephone. Detailed letter mailed to patient informing her per Dr. Philipp Mauricio:   Tyrone Kevinradha x-ray is unremarkable. You can lose 20% of lung function from smoking before the chest x-ray ever becomes abnormal.  Continue to work on smoking cessation. \"

## 2018-10-06 ENCOUNTER — HOSPITAL ENCOUNTER (OUTPATIENT)
Dept: ULTRASOUND IMAGING | Age: 63
Discharge: HOME | End: 2018-10-06
Attending: FAMILY MEDICINE
Payer: COMMERCIAL

## 2018-10-06 DIAGNOSIS — R79.89 ELEVATED LIVER FUNCTION TESTS: ICD-10-CM

## 2018-10-06 PROCEDURE — 76705 ECHO EXAM OF ABDOMEN: CPT

## 2018-10-08 ENCOUNTER — TELEPHONE (OUTPATIENT)
Dept: FAMILY MEDICINE CLINIC | Age: 63
End: 2018-10-08

## 2018-10-09 DIAGNOSIS — K75.81 NASH (NONALCOHOLIC STEATOHEPATITIS): Chronic | ICD-10-CM

## 2018-10-09 DIAGNOSIS — K76.0 FATTY LIVER: Primary | ICD-10-CM

## 2018-11-18 RX ORDER — LISINOPRIL 40 MG/1
TABLET ORAL
Qty: 90 TAB | Refills: 2 | Status: SHIPPED | OUTPATIENT
Start: 2018-11-18 | End: 2019-08-15 | Stop reason: SDUPTHER

## 2018-12-16 RX ORDER — LOVASTATIN 10 MG/1
TABLET ORAL
Qty: 90 TAB | Refills: 3 | Status: SHIPPED | OUTPATIENT
Start: 2018-12-16 | End: 2019-09-20

## 2019-08-12 NOTE — TELEPHONE ENCOUNTER
Phone call to patient to schedule an appointment for her medication refills(Atenolol and Amlodipine), no answer. Detailed letter mailed to patient requesting that she call [facility] to schedule an appointment for her refills.

## 2019-08-12 NOTE — LETTER
8/12/2019 3:02 PM 
 
Ms. Irene Laura [address] [facility] [address] Dear Ms. Alarcon: 
 
We've missed you! Please call our office at [phone] and schedule a follow up appointment for your continued care and medication refills. Sincerely, LUCIANO CARPIO & ANIL MORALES [facility] & [facility] [place]

## 2019-08-29 RX ORDER — AMLODIPINE BESYLATE 10 MG/1
TABLET ORAL
Qty: 90 TAB | Refills: 2 | Status: SHIPPED | OUTPATIENT
Start: 2019-08-29 | End: 2020-05-11

## 2019-08-29 RX ORDER — LISINOPRIL 40 MG/1
TABLET ORAL
Qty: 90 TAB | Refills: 2 | Status: SHIPPED | OUTPATIENT
Start: 2019-08-29 | End: 2020-05-11

## 2019-08-29 RX ORDER — ATENOLOL 25 MG/1
TABLET ORAL
Qty: 180 TAB | Refills: 2 | Status: SHIPPED | OUTPATIENT
Start: 2019-08-29 | End: 2020-05-11

## 2019-09-17 ENCOUNTER — OFFICE VISIT (OUTPATIENT)
Dept: FAMILY MEDICINE CLINIC | Age: 64
End: 2019-09-17

## 2019-09-17 VITALS
HEIGHT: 66 IN | HEART RATE: 60 BPM | RESPIRATION RATE: 20 BRPM | WEIGHT: 256 LBS | OXYGEN SATURATION: 96 % | SYSTOLIC BLOOD PRESSURE: 116 MMHG | TEMPERATURE: 98.3 F | BODY MASS INDEX: 41.14 KG/M2 | DIASTOLIC BLOOD PRESSURE: 71 MMHG

## 2019-09-17 DIAGNOSIS — I10 ESSENTIAL HYPERTENSION: Primary | Chronic | ICD-10-CM

## 2019-09-17 DIAGNOSIS — L98.9 SCALP LESION: Chronic | ICD-10-CM

## 2019-09-17 DIAGNOSIS — R73.9 ELEVATED BLOOD SUGAR: Chronic | ICD-10-CM

## 2019-09-17 DIAGNOSIS — K75.81 NASH (NONALCOHOLIC STEATOHEPATITIS): Chronic | ICD-10-CM

## 2019-09-17 DIAGNOSIS — R79.89 ELEVATED LFTS: Chronic | ICD-10-CM

## 2019-09-17 DIAGNOSIS — E66.01 SEVERE OBESITY WITH BODY MASS INDEX (BMI) OF 35.0 TO 39.9 WITH SERIOUS COMORBIDITY (HCC): ICD-10-CM

## 2019-09-17 DIAGNOSIS — E78.00 HYPERCHOLESTEROLEMIA: Chronic | ICD-10-CM

## 2019-09-17 DIAGNOSIS — F17.200 SMOKER: ICD-10-CM

## 2019-09-17 DIAGNOSIS — E03.9 HYPOTHYROIDISM, UNSPECIFIED TYPE: Chronic | ICD-10-CM

## 2019-09-17 DIAGNOSIS — K58.0 IRRITABLE BOWEL SYNDROME WITH DIARRHEA: Chronic | ICD-10-CM

## 2019-09-17 DIAGNOSIS — Z23 ENCOUNTER FOR IMMUNIZATION: ICD-10-CM

## 2019-09-17 NOTE — PROGRESS NOTES
Chief Complaint   Patient presents with    Skin Problem     Body mass index is 41.32 kg/m². 1. Have you been to the ER, urgent care clinic since your last visit? Hospitalized since your last visit? No    2. Have you seen or consulted any other health care providers outside of the [address] [place] [address] since your last visit? Include any pap smears or colon screening.  No    Reviewed record in preparation for visit and have necessary documentation  Pt did not bring medication to office visit for review  Information was given to pt on Advanced Directives, Living Will  Information was given on Shingles Vaccine  Opportunity was given for questions  Goals that were addressed and/or need to be completed after this appointment include:     Health Maintenance Due   Topic Date Due    Shingrix Vaccine Age 50> (1 of 2) 07/13/2006    PAP AKA CERVICAL CYTOLOGY  08/02/2016    BREAST CANCER SCRN MAMMOGRAM  08/26/2016    Influenza Age 9 to Adult  08/01/2019

## 2019-09-17 NOTE — PROGRESS NOTES
[address] [facility] [place], [address] [facility]  [phone]           Progress Note    Patient: [place] Rafal MRN: [id number]  SSN: [id number]    YOB: 1955  Age: 62 y.o. Sex: female        Chief Complaint   Patient presents with    Skin Problem         Subjective:     Encounter Diagnoses   Name Primary?  Essential hypertension:  BP Readings from Last 3 Encounters:   09/17/19 116/71   10/03/18 119/79   08/24/18 122/81     The patient reports:  taking medications as instructed, no medication side effects noted, no TIA's, no chest pain on exertion, no dyspnea on exertion, no swelling of ankles. Key CAD CHF Meds             amLODIPine (NORVASC) 10 mg tablet (Taking) TAKE 1 TABLET DAILY FOR HIGH BLOOD PRESSURE    atenolol (TENORMIN) 25 mg tablet (Taking) TAKE 1 TABLET TWICE A DAY    lisinopril (PRINIVIL, ZESTRIL) 40 mg tablet (Taking) TAKE 1 TABLET DAILY    lovastatin (MEVACOR) 10 mg tablet (Taking) TAKE 1 TABLET NIGHTLY    aspirin delayed-release 81 mg tablet (Taking) Take  by mouth daily. hydrochlorothiazide (HYDRODIURIL) 12.5 mg tablet Take 1 Tab by mouth daily. Lab Results   Component Value Date/Time    Sodium 140 08/24/2018 04:56 PM    Potassium 4.6 08/24/2018 04:56 PM    Chloride 101 08/24/2018 04:56 PM    CO2 24 08/24/2018 04:56 PM    Anion gap 6 03/13/2016 06:32 PM    Glucose 109 (H) 08/24/2018 04:56 PM    BUN 15 08/24/2018 04:56 PM    Creatinine 0.66 08/24/2018 04:56 PM    BUN/Creatinine ratio 23 08/24/2018 04:56 PM    GFR est  08/24/2018 04:56 PM    GFR est non-AA 95 08/24/2018 04:56 PM    Calcium 9.3 08/24/2018 04:56 PM    Bilirubin, total 0.5 08/24/2018 04:56 PM    AST (SGOT) 40 08/24/2018 04:56 PM    Alk.  phosphatase 68 08/24/2018 04:56 PM    Protein, total 7.1 08/24/2018 04:56 PM    Albumin 4.1 08/24/2018 04:56 PM    Globulin 4.2 (H) 03/13/2016 06:32 PM    A-G Ratio 1.4 08/24/2018 04:56 PM    ALT (SGPT) 43 (H) 08/24/2018 04:56 PM     Low salt diet? yes  Aerobic exercise? no  Our goal is to normalize the blood pressure to decrease the risks of strokes and heart attacks. The patient is in agreement with the plan. Yes    Hypercholesterolemia:  Cardiovascular risks for her are: LDL goal is under 100  hypertension  hyperlipidemia  smoker. Key Antihyperlipidemia Meds             lovastatin (MEVACOR) 10 mg tablet (Taking) TAKE 1 TABLET NIGHTLY        Lab Results   Component Value Date/Time    Cholesterol, total 179 08/24/2018 04:56 PM    HDL Cholesterol 60 08/24/2018 04:56 PM    LDL, calculated 97 08/24/2018 04:56 PM    Triglyceride 112 08/24/2018 04:56 PM    CHOL/HDL Ratio 4.2 12/04/2009 08:36 AM     Lab Results   Component Value Date/Time    ALT (SGPT) 43 (H) 08/24/2018 04:56 PM    AST (SGOT) 40 08/24/2018 04:56 PM    Alk. phosphatase 68 08/24/2018 04:56 PM    Bilirubin, total 0.5 08/24/2018 04:56 PM      Myalgias: No   Fatigue: No   Other side effects: no  Wt Readings from Last 3 Encounters:   09/17/19 256 lb (116.1 kg)   10/03/18 243 lb 9.6 oz (110.5 kg)   08/24/18 239 lb 9.6 oz (108.7 kg)     The patient is aware of our goal to reduce or eliminate the long term problems (such as strokes and heart attacks) related to poorly controlled hyperlipidemia.  Irritable bowel syndrome with diarrhea: She has bouts of IBS once or twice a month for 1 day.  Elevated LFTs: Due to ERICKSON.  Hypothyroidism, unspecified type:  Lab Results   Component Value Date/Time    TSH 2.640 08/24/2018 04:56 PM    T4, Free 1.07 08/24/2018 04:56 PM      Denies fatigue, nervousness,weight changes, heat orcold intolerance, bowel changes,skin changes, cardiovascular symptoms, hair loss, feeling excessive energy, tremor, palpitations and weight loss. Thyroid medication has been unchanged since last medication check and labs.          ERICKSON (nonalcoholic steatohepatitis): US 9/18-IMPRESSION:  Diffusely increased hepatic echogenicity compatible with hepatic steatosis.  Elevated blood sugar: Most likely prediabetic. Continues to gain weight. No sx of fulminant diabetes. No significant weight loss or gain. The patient realizes that without weight loss and exercise they are high risk for overt diabetes. Lab Results   Component Value Date/Time    Hemoglobin A1c 5.6 08/24/2018 04:56 PM     Lab Results   Component Value Date/Time    Glucose 109 (H) 08/24/2018 04:56 PM     Wt Readings from Last 3 Encounters:   09/17/19 256 lb (116.1 kg)   10/03/18 243 lb 9.6 oz (110.5 kg)   08/24/18 239 lb 9.6 oz (108.7 kg)     Body mass index is 41.32 kg/m².  Smoker: Due to life and job stresses she is smoking over 1 pack/day now.  Severe obesity with body mass index (BMI) of 35.0 to 39.9 with serious comorbidity ([address]): Body mass index is 41.32 kg/m². Wt Readings from Last 3 Encounters:   09/17/19 256 lb (116.1 kg)   10/03/18 243 lb 9.6 oz (110.5 kg)   08/24/18 239 lb 9.6 oz (108.7 kg)     Exercise capacity: fair  I have reviewed/discussed the above normal BMI with the patient. I have recommended the following interventions: dietary management education, guidance, and counseling . Elissa Cast Recommended diet: low starch and low carbs.  Encounter for immunization:  Flu shot given today.  Scalp lesion: She noticed a lesion on her posterior scalp about 2 weeks ago. Is approximately 6 mm x 6 mm and near the midline slightly to the right. It is skin colored and has a texture like seborrheic keratosis. Does not look malignant. We will follow it for now. She will let me know if it grows or changes in any way. Current and past medical information:    Current Medications after this visit[de-identified]     Current Outpatient Medications   Medication Sig    vitamin E acetate (VITAMIN E PO) Take  by mouth.     levothyroxine (SYNTHROID) 50 mcg tablet TAKE 1 TABLET DAILY BEFORE BREAKFAST    amLODIPine (NORVASC) 10 mg tablet TAKE 1 TABLET DAILY FOR HIGH BLOOD PRESSURE    atenolol (TENORMIN) 25 mg tablet TAKE 1 TABLET TWICE A DAY    lisinopril (PRINIVIL, ZESTRIL) 40 mg tablet TAKE 1 TABLET DAILY    lovastatin (MEVACOR) 10 mg tablet TAKE 1 TABLET NIGHTLY    albuterol (PROVENTIL HFA, VENTOLIN HFA, PROAIR HFA) 90 mcg/actuation inhaler Take 1 Puff by inhalation every six (6) hours as needed for Wheezing.  aspirin delayed-release 81 mg tablet Take  by mouth daily.  PV W-O TESFAYE/FERROUS FUMARATE/FA (M-VIT PO) Take  by mouth.  loratadine (CLARITIN) 10 mg tablet Take 10 mg by mouth daily.  cholecalciferol, vitamin d3, (VITAMIN D) 1,000 unit tablet Take  by mouth daily.  cyanocobalamin (VITAMIN B-12) 1,000 mcg tablet Take 1,000 mcg by mouth daily.  ondansetron hcl (ZOFRAN) 4 mg tablet     raNITIdine (ZANTAC) 150 mg tablet     hydrochlorothiazide (HYDRODIURIL) 12.5 mg tablet Take 1 Tab by mouth daily.  fish oil-dha-epa (FISH OIL) 1,200-144-216 mg Cap Take  by mouth. No current facility-administered medications for this visit.         Patient Active Problem List    Diagnosis Date Noted    Smoker 07/22/2013     Priority: 1 - One    Scalp lesion 09/17/2019    Severe obesity (BMI 35.0-39.9) 08/24/2018    Elevated blood sugar 07/15/2015    ERICKSON (nonalcoholic steatohepatitis) 07/19/2013    Hypothyroidism 11/16/2012    Colon polyps 11/13/2012    Hypertension     Irritable bowel syndrome     Hypercholesterolemia     Dysmetabolic syndrome X     Elevated LFTs     Abnormal weight gain     Anxiety        Past Medical History:   Diagnosis Date    Abnormal LFT's     Abnormal weight gain     Anxiety     Depression     Dysmetabolic syndrome X     Hypercholesterolemia     Hypertension     Irritable bowel syndrome     Thyroid disease        Allergies   Allergen Reactions    Pravachol [Pravastatin] Myalgia    Zetia [Ezetimibe] Myalgia       Past Surgical History:   Procedure Laterality Date    HX CYST REMOVAL      neck    HX OTHER SURGICAL      DNC around 26years of age   Pipe Dhaliwal HX OTHER SURGICAL      wisdom teeth extraction       Social History     Socioeconomic History    Marital status:      Spouse name: Not on file    Number of children: Not on file    Years of education: Not on file    Highest education level: Not on file   Tobacco Use    Smoking status: Current Every Day Smoker     Packs/day: 1.00     Years: 35.00     Pack years: 35.00    Smokeless tobacco: Never Used   Substance and Sexual Activity    Alcohol use: Yes     Comment: 2 boxes of wine each week    Drug use: No    Sexual activity: Yes     Partners: Male       Review of Systems   Constitutional: Negative. Negative for chills, fever, malaise/fatigue and weight loss. HENT: Negative. Negative for hearing loss. Eyes: Negative. Negative for blurred vision and double vision. Respiratory: Negative. Negative for cough, sputum production and shortness of breath. Will get over 1 pack/day. Cardiovascular: Negative. Negative for chest pain and palpitations. Gastrointestinal: Negative. Negative for abdominal pain, blood in stool, heartburn, nausea and vomiting. Genitourinary: Negative. Negative for dysuria, frequency and urgency. Musculoskeletal: Negative. Negative for back pain, falls, myalgias and neck pain. Skin: Positive for rash. Scalp lesion as described. Neurological: Negative. Negative for dizziness, tingling, tremors, weakness and headaches. Endo/Heme/Allergies: Negative. Psychiatric/Behavioral: Negative. Negative for depression. Objective:     Vitals:    09/17/19 1316   BP: 116/71   Pulse: 60   Resp: 20   Temp: 98.3 °F (36.8 °C)   TempSrc: Oral   SpO2: 96%   Weight: 256 lb (116.1 kg)   Height: 5' 6\" (1.676 m)      Body mass index is 41.32 kg/m².     Physical Exam   Constitutional: She is oriented to person, place, and time and well-developed, well-nourished, and in no distress. No distress. HENT:   Head: Normocephalic and atraumatic. Mouth/Throat: Oropharynx is clear and moist.   Eyes: Conjunctivae are normal.   Neck: No thyromegaly present. No carotid bruit. Cardiovascular: Normal rate, regular rhythm and normal heart sounds. No murmur heard. Pulmonary/Chest: Effort normal. No respiratory distress. She has no wheezes. She has slightly decreased breath sounds. Abdominal: Soft. She exhibits no distension. There is no tenderness. There is no rebound and no guarding. Musculoskeletal: She exhibits no edema. Neurological: She is alert and oriented to person, place, and time. Skin: Skin is warm. No rash noted. She is not diaphoretic. No erythema. Psychiatric: Mood and affect normal.   Nursing note and vitals reviewed. Health Maintenance Due   Topic Date Due    Shingrix Vaccine Age 50> (1 of 2) 07/13/2006    PAP AKA CERVICAL CYTOLOGY  08/02/2016    BREAST CANCER SCRN MAMMOGRAM  08/26/2016    Influenza Age 9 to Adult  08/01/2019         Assessment and orders:     Encounter Diagnoses     ICD-10-CM ICD-9-CM   1. Essential hypertension I10 401.9   2. Hypercholesterolemia E78.00 272.0   3. Irritable bowel syndrome with diarrhea K58.0 564.1   4. Elevated LFTs R94.5 790.6   5. Hypothyroidism, unspecified type E03.9 244.9   6. ERICKSON (nonalcoholic steatohepatitis) K75.81 571.8   7. Elevated blood sugar R73.9 790.29   8. Smoker F17.200 305.1   9. Severe obesity with body mass index (BMI) of 35.0 to 39.9 with serious comorbidity (HCC) E66.01 278.01   10. Encounter for immunization Z23 V03.89   11. Scalp lesion L98.9 709.9     Diagnoses and all orders for this visit:    1. Essential hypertension-controlled. Was overdue for an appointment.  -     HEMOGLOBIN A1C WITH EAG  -     LIPID PANEL  -     METABOLIC PANEL, COMPREHENSIVE    2. Hypercholesterolemia-retest  -     LIPID PANEL  -     METABOLIC PANEL, COMPREHENSIVE    3.  Irritable bowel syndrome with diarrhea-stable    4. Elevated LFTs-retest  -     LIPID PANEL  -     METABOLIC PANEL, COMPREHENSIVE    5. Hypothyroidism, unspecified type-retest  -     T4, FREE  -     TSH 3RD GENERATION    6. ERICKSON (nonalcoholic steatohepatitis)-retest  -     LIPID PANEL  -     METABOLIC PANEL, COMPREHENSIVE    7. Elevated blood suga-retest  -     HEMOGLOBIN A1C WITH EAG  -     LIPID PANEL  -     METABOLIC PANEL, COMPREHENSIVE    8. Smoker>1ppd    9. Severe obesity with body mass index (BMI) of 35.0 to 39.9 with serious comorbidity ([facility] [address]    10. Encounter for immunization  -     CO IMMUNIZ ADMIN,1 SINGLE/COMB VAC/TOXOID  -     INFLUENZA VIRUS VAC QUAD,SPLIT,PRESV FREE SYRINGE IM    11. Scalp lesion-benign. Let me know if it gets larger or changes in any way. She will have to enlist her 's help and watching this. Plan of care:  Discussed diagnoses in detail with patient. Medication risks/benefits/side effects discussed with patient. All of the patient's questions were addressed. The patient understands and agrees with our plan of care. The patient knows to call back if they are unsure of or forget any changes we discussed today or if the symptoms change. The patient received an After-Visit Summary which contains VS, orders, medication list and allergy list. This can be used as a \"mini-medical record\" should they have to seek medical care while out of town. Patient Care Team:  Jose Baker MD as PCP - General  Ubaldo Wade MD as Physician (Gastroenterology)  Trell Bergeron MD (General Surgery)    Follow-up and Dispositions    · Return in about 3 months (around 12/17/2019). Future Appointments   Date Time Provider Vel Orr   12/18/2019  8:20 AM Jose Baker MD [facility] KARLI SCHED       Signed By: Adrian Mason MD     September 17, 2019      ATTENTION:   This medical record was transcribed using an electronic medical records/speech recognition system.   Although proofread, it may and can contain electronic, spelling and other errors. Corrections may be executed at a later time. Please feel free to contact me for any clarifications as needed.

## 2019-09-18 LAB
ALBUMIN SERPL-MCNC: 4.2 G/DL (ref 3.6–4.8)
ALBUMIN/GLOB SERPL: 1.4 {RATIO} (ref 1.2–2.2)
ALP SERPL-CCNC: 77 IU/L (ref 39–117)
ALT SERPL-CCNC: 43 IU/L (ref 0–32)
AST SERPL-CCNC: 50 IU/L (ref 0–40)
BILIRUB SERPL-MCNC: 0.6 MG/DL (ref 0–1.2)
BUN SERPL-MCNC: 9 MG/DL (ref 8–27)
BUN/CREAT SERPL: 15 (ref 12–28)
CALCIUM SERPL-MCNC: 9.7 MG/DL (ref 8.7–10.3)
CHLORIDE SERPL-SCNC: 101 MMOL/L (ref 96–106)
CHOLEST SERPL-MCNC: 197 MG/DL (ref 100–199)
CO2 SERPL-SCNC: 24 MMOL/L (ref 20–29)
CREAT SERPL-MCNC: 0.61 MG/DL (ref 0.57–1)
EST. AVERAGE GLUCOSE BLD GHB EST-MCNC: 123 MG/DL
GLOBULIN SER CALC-MCNC: 2.9 G/DL (ref 1.5–4.5)
GLUCOSE SERPL-MCNC: 106 MG/DL (ref 65–99)
HBA1C MFR BLD: 5.9 % (ref 4.8–5.6)
HDLC SERPL-MCNC: 57 MG/DL
LDLC SERPL CALC-MCNC: 112 MG/DL (ref 0–99)
POTASSIUM SERPL-SCNC: 4.6 MMOL/L (ref 3.5–5.2)
PROT SERPL-MCNC: 7.1 G/DL (ref 6–8.5)
SODIUM SERPL-SCNC: 140 MMOL/L (ref 134–144)
T4 FREE SERPL-MCNC: 1.17 NG/DL (ref 0.82–1.77)
TRIGL SERPL-MCNC: 140 MG/DL (ref 0–149)
TSH SERPL DL<=0.005 MIU/L-ACNC: 2.39 UIU/ML (ref 0.45–4.5)
VLDLC SERPL CALC-MCNC: 28 MG/DL (ref 5–40)

## 2019-09-20 ENCOUNTER — TELEPHONE (OUTPATIENT)
Dept: FAMILY MEDICINE CLINIC | Age: 64
End: 2019-09-20

## 2019-09-20 RX ORDER — ROSUVASTATIN CALCIUM 20 MG/1
20 TABLET, COATED ORAL
Qty: 30 TAB | Refills: 1 | Status: SHIPPED | OUTPATIENT
Start: 2019-09-20 | End: 2019-10-29 | Stop reason: SDUPTHER

## 2019-09-20 NOTE — TELEPHONE ENCOUNTER
Telephone Encounter        Caller's first/last name:  Tram Singh      Relationship to patient and are they on HIPAA:  Self      Reason for call:  Med Eval      Further clarification of call:  Pt calling in response to lab letter. She states that she would like to increase her Cholesterol Medication. Does caller want a return call? Yes, once Rx has been sent to pharmacy. Best contact number:  [phone]      Did you check for a duplicate Encounter?   Yes

## 2019-09-20 NOTE — TELEPHONE ENCOUNTER
Called and spoke to patient. Advised her per Dr. Garland De La Rosa:  Wayne Beaver in generic Crestor 20 mg to take at bedtime.  If she gets baking tell her to cut the pill in half.  If that does not work tell her to call me. Adelaide Mendezer me in 6 weeks for repeat lipid testing.  Call if she has to stop the medication. She verbalized understanding. Appt scheduled.

## 2019-09-24 ENCOUNTER — TELEPHONE (OUTPATIENT)
Dept: FAMILY MEDICINE CLINIC | Age: 64
End: 2019-09-24

## 2019-09-24 DIAGNOSIS — Z12.31 SCREENING MAMMOGRAM FOR HIGH-RISK PATIENT: Primary | ICD-10-CM

## 2019-09-24 NOTE — TELEPHONE ENCOUNTER
----- Message from Kindra Purdy sent at 9/20/2019 12:38 PM EDT -----  Regarding: FW: Dr. Muniz Son: [phone]      ----- Message -----  From: Jerzykala Gutierrezovan  Sent: 9/20/2019  12:23 PM EDT  To: 's Front Office Pool  Subject: Dr. Darrell Pittman                               Appointment Request From: Alison Prater    With Provider: Octaviano Gonzales MD [-Primary Care Physician-]    Preferred Date Range: From 9/20/2019 To 10/31/2019    Preferred Times: Any    Reason: To address the following health maintenance concerns. Breast Cancer Scrn Mammogram    Comments:  My last mammogram was at [facility], which would be most the convenient.   I would prefer a Tues or Weds appt.     Thanks

## 2019-09-25 ENCOUNTER — TELEPHONE (OUTPATIENT)
Dept: FAMILY MEDICINE CLINIC | Age: 64
End: 2019-09-25

## 2019-09-25 DIAGNOSIS — Z12.31 SCREENING MAMMOGRAM FOR HIGH-RISK PATIENT: ICD-10-CM

## 2019-09-25 NOTE — TELEPHONE ENCOUNTER
An order for a mammogram has been faxed to [facility] in [place]. Their office will contact the patient to schedule an appointment.

## 2019-10-29 ENCOUNTER — OFFICE VISIT (OUTPATIENT)
Dept: FAMILY MEDICINE CLINIC | Age: 64
End: 2019-10-29

## 2019-10-29 VITALS
WEIGHT: 250 LBS | TEMPERATURE: 98.2 F | DIASTOLIC BLOOD PRESSURE: 76 MMHG | SYSTOLIC BLOOD PRESSURE: 117 MMHG | HEIGHT: 66 IN | RESPIRATION RATE: 16 BRPM | OXYGEN SATURATION: 98 % | HEART RATE: 54 BPM | BODY MASS INDEX: 40.18 KG/M2

## 2019-10-29 DIAGNOSIS — K75.81 NASH (NONALCOHOLIC STEATOHEPATITIS): Primary | ICD-10-CM

## 2019-10-29 DIAGNOSIS — E78.00 HYPERCHOLESTEROLEMIA: ICD-10-CM

## 2019-10-29 DIAGNOSIS — I88.9 LYMPHADENITIS: ICD-10-CM

## 2019-10-29 RX ORDER — AMOXICILLIN 500 MG/1
500 CAPSULE ORAL 3 TIMES DAILY
Qty: 21 CAP | Refills: 0 | Status: SHIPPED | OUTPATIENT
Start: 2019-10-29 | End: 2019-11-05

## 2019-10-29 RX ORDER — ROSUVASTATIN CALCIUM 20 MG/1
20 TABLET, COATED ORAL
Qty: 30 TAB | Refills: 5 | Status: SHIPPED | OUTPATIENT
Start: 2019-10-29 | End: 2019-11-06 | Stop reason: SDUPTHER

## 2019-10-29 NOTE — PROGRESS NOTES
1. Have you been to the ER, urgent care clinic since your last visit? Hospitalized since your last visit? No    2. Have you seen or consulted any other health care providers outside of the [address] [place] [address] since your last visit? Include any pap smears or colon screening.  No  Reviewed record in preparation for visit and have necessary documentation  Pt did not bring medication to office visit for review    Goals that were addressed and/or need to be completed during or after this appointment include     Health Maintenance Due   Topic Date Due    Shingrix Vaccine Age 50> (1 of 2) 07/13/2006    PAP AKA CERVICAL CYTOLOGY  08/02/2016    BREAST CANCER SCRN MAMMOGRAM  08/26/2016

## 2019-10-29 NOTE — PATIENT INSTRUCTIONS
High Cholesterol in Teens: Care Instructions Your Care Instructions Cholesterol is a type of fat in your blood. It is needed for many body functions, such as making new cells. Cholesterol is made by your body. It also comes from food you eat. High cholesterol means you have too much of the fat in your blood. If you have high cholesterol, this fat can build up inside your blood vessel walls. Over time, this raises your risk of having heart disease, a heart attack, or a stroke. You can improve your cholesterol levels and lower your risk of heart disease with healthy habits. But for some people, cholesterol problems run in the family. If changes in diet and exercise don't improve your cholesterol levels, you and your doctor can decide if medicine is right for you. Follow-up care is a key part of your treatment and safety. Be sure to make and go to all appointments, and call your doctor if you are having problems. It's also a good idea to know your test results and keep a list of the medicines you take. How can you care for yourself at home? · Eat a variety of foods every day. Good choices include fruits, vegetables, whole grains (like oatmeal), dried beans and peas, and nuts and seeds. Other good choices are soy products (like tofu) and fat-free or low-fat dairy products. · Use olive and canola oils instead of butter, margarine, or hydrogenated or partially hydrogenated oils. (Canola oil margarine without trans fat is fine.) · Replace red meat with fish, poultry, and soy protein (like tofu). · Limit processed and packaged foods like chips, crackers, and cookies. · Bake, broil, or steam foods instead of frying them. · Be physically active. Get plenty of exercise every day. Go for a walk or jog, ride your bike, or play sports with friends. · Stay at a healthy weight or lose weight by making the changes in eating and physical activity listed above.  Losing just a small amount of weight, even 5 to 10 pounds, can reduce your risk for having a heart attack or stroke. · Do not smoke. Smoking can increase the chance you will have a heart attack. If you need help quitting, talk to your doctor about stop-smoking programs and medicines. These can increase your chances of quitting for good. · If you take medicine for high cholesterol, be sure to take it every day. When should you call for help? Call your doctor now or seek immediate medical care if: 
  · You have any problems.  
  · You notice body aches or muscle pain.  
 Watch closely for changes in your health, and be sure to contact your doctor if: 
  · You want help in making diet and exercise changes.  
  · You are worried about your cholesterol level.  
  · You have family members with very high cholesterol levels. Where can you learn more? Go to http://rhoda-maggie.info/. Enter C030 in the search box to learn more about \"High Cholesterol in Teens: Care Instructions. \" Current as of: April 9, 2019 Content Version: 12.2 © 5706-5786 [employer]. Care instructions adapted under license by [employer] (which disclaims liability or warranty for this information). If you have questions about a medical condition or this instruction, always ask your healthcare professional. [address] any warranty or liability for your use of this information.

## 2019-10-29 NOTE — PROGRESS NOTES
[facility], [address] [facility]  [phone]           Progress Note    Patient: Chritsi Islas MRN: [id number]  SSN: [id number]    YOB: 1955  Age: 62 y.o. Sex: female        Chief Complaint   Patient presents with    Follow-up    Neck Swelling     on left side for 2 days         Subjective:     Encounter Diagnoses   Name Primary?  ERICKSON (nonalcoholic steatohepatitis): Her ultrasound of her liver last year did not reveal enlargement of her liver just fatty deposits. Our hope is that she can tolerate the cholesterol medicine that her liver function tests going up. Lab Results   Component Value Date/Time    ALT (SGPT) 43 (H) 09/17/2019 01:48 PM    AST (SGOT) 50 (H) 09/17/2019 01:48 PM    Alk. phosphatase 77 09/17/2019 01:48 PM    Bilirubin, total 0.6 09/17/2019 01:48 PM      Yes    Hypercholesterolemia:  Cardiovascular risks for her are: LDL goal is under 100  hypertension  hyperlipidemia  smoker. Key Antihyperlipidemia Meds             rosuvastatin (CRESTOR) 20 mg tablet (Taking) Take 1 Tab by mouth nightly. Indications: high cholesterol        Lab Results   Component Value Date/Time    Cholesterol, total 197 09/17/2019 01:48 PM    HDL Cholesterol 57 09/17/2019 01:48 PM    LDL, calculated 112 (H) 09/17/2019 01:48 PM    Triglyceride 140 09/17/2019 01:48 PM    CHOL/HDL Ratio 4.2 12/04/2009 08:36 AM     Lab Results   Component Value Date/Time    ALT (SGPT) 43 (H) 09/17/2019 01:48 PM    AST (SGOT) 50 (H) 09/17/2019 01:48 PM    Alk.  phosphatase 77 09/17/2019 01:48 PM    Bilirubin, total 0.6 09/17/2019 01:48 PM      Myalgias: No   Fatigue: No   Other side effects: no  Wt Readings from Last 3 Encounters:   10/29/19 250 lb (113.4 kg)   09/17/19 256 lb (116.1 kg)   10/03/18 243 lb 9.6 oz (110.5 kg)     The patient is aware of our goal to reduce or eliminate the long term problems (such as strokes and heart attacks) related to poorly controlled hyperlipidemia.  Lymphadenitis: She had dental procedure left lower tooth last week. Now she notices swelling and puffiness at the angle of left side of her jaw.no fever chills or sweats but this looks like a lymphadenitis related to infectious drainage. Current and past medical information:    Current Medications after this visit[de-identified]     Current Outpatient Medications   Medication Sig    rosuvastatin (CRESTOR) 20 mg tablet Take 1 Tab by mouth nightly. Indications: high cholesterol    amoxicillin (AMOXIL) 500 mg capsule Take 1 Cap by mouth three (3) times daily for 7 days.  vitamin E acetate (VITAMIN E PO) Take  by mouth.  levothyroxine (SYNTHROID) 50 mcg tablet TAKE 1 TABLET DAILY BEFORE BREAKFAST    amLODIPine (NORVASC) 10 mg tablet TAKE 1 TABLET DAILY FOR HIGH BLOOD PRESSURE    atenolol (TENORMIN) 25 mg tablet TAKE 1 TABLET TWICE A DAY    lisinopril (PRINIVIL, ZESTRIL) 40 mg tablet TAKE 1 TABLET DAILY    albuterol (PROVENTIL HFA, VENTOLIN HFA, PROAIR HFA) 90 mcg/actuation inhaler Take 1 Puff by inhalation every six (6) hours as needed for Wheezing.  aspirin delayed-release 81 mg tablet Take  by mouth daily.  PV W-O TESFAYE/FERROUS FUMARATE/FA (M-VIT PO) Take  by mouth.  loratadine (CLARITIN) 10 mg tablet Take 10 mg by mouth daily.  cholecalciferol, vitamin d3, (VITAMIN D) 1,000 unit tablet Take  by mouth daily.  cyanocobalamin (VITAMIN B-12) 1,000 mcg tablet Take 1,000 mcg by mouth daily.  ondansetron hcl (ZOFRAN) 4 mg tablet     raNITIdine (ZANTAC) 150 mg tablet     hydrochlorothiazide (HYDRODIURIL) 12.5 mg tablet Take 1 Tab by mouth daily.  fish oil-dha-epa (FISH OIL) 1,200-144-216 mg Cap Take  by mouth. No current facility-administered medications for this visit.         Patient Active Problem List    Diagnosis Date Noted    Smoker 07/22/2013     Priority: 1 - One    Scalp lesion 09/17/2019    Severe obesity (BMI 35.0-39.9) 08/24/2018  Elevated blood sugar 07/15/2015    ERICKSON (nonalcoholic steatohepatitis) 07/19/2013    Hypothyroidism 11/16/2012    Colon polyps 11/13/2012    Hypertension     Irritable bowel syndrome     Hypercholesterolemia     Dysmetabolic syndrome X     Elevated LFTs     Abnormal weight gain     Anxiety        Past Medical History:   Diagnosis Date    Abnormal LFT's     Abnormal weight gain     Anxiety     Depression     Dysmetabolic syndrome X     Hypercholesterolemia     Hypertension     Irritable bowel syndrome     Thyroid disease        Allergies   Allergen Reactions    Pravachol [Pravastatin] Myalgia    Zetia [Ezetimibe] Myalgia       Past Surgical History:   Procedure Laterality Date    HX CYST REMOVAL      neck    HX OTHER SURGICAL      DNC around 26years of age   [place] OTHER SURGICAL      wisdom teeth extraction       Social History     Socioeconomic History    Marital status:      Spouse name: Not on file    Number of children: Not on file    Years of education: Not on file    Highest education level: Not on file   Tobacco Use    Smoking status: Current Every Day Smoker     Packs/day: 1.00     Years: 35.00     Pack years: 35.00    Smokeless tobacco: Never Used   Substance and Sexual Activity    Alcohol use: Yes     Comment: 2 boxes of wine each week    Drug use: No    Sexual activity: Yes     Partners: Male       Review of Systems   Constitutional: Negative. Negative for chills, fever, malaise/fatigue and weight loss. HENT: Negative. Negative for hearing loss. Swollen left side of her neck. Recent dental work. Eyes: Negative. Negative for blurred vision and double vision. Respiratory: Negative. Negative for cough, sputum production and shortness of breath. Cardiovascular: Negative. Negative for chest pain and palpitations. Gastrointestinal: Negative. Negative for abdominal pain, blood in stool, heartburn, nausea and vomiting.    Genitourinary: Negative. Negative for dysuria, frequency and urgency. Musculoskeletal: Negative. Negative for back pain, falls, myalgias and neck pain. Skin: Negative. Negative for rash. Neurological: Negative. Negative for dizziness, tingling, tremors, weakness and headaches. Endo/Heme/Allergies: Negative. Psychiatric/Behavioral: Negative. Negative for depression. Objective:     Vitals:    10/29/19 0939   BP: 117/76   Pulse: (!) 54   Resp: 16   Temp: 98.2 °F (36.8 °C)   TempSrc: Oral   SpO2: 98%   Weight: 250 lb (113.4 kg)   Height: 5' 6\" (1.676 m)      Body mass index is 40.35 kg/m². Physical Exam   Constitutional: She is oriented to person, place, and time and well-developed, well-nourished, and in no distress. No distress. HENT:   Head: Normocephalic and atraumatic. Mouth/Throat: Oropharynx is clear and moist.   Eyes: Conjunctivae are normal.   Neck: No thyromegaly present. Enlarged lymph node at the angle of her left jaw. Cardiovascular: Normal rate, regular rhythm and normal heart sounds. No murmur heard. Pulmonary/Chest: Effort normal and breath sounds normal. No respiratory distress. Abdominal: Soft. She exhibits no distension. Lymphadenopathy:     She has cervical adenopathy. Neurological: She is alert and oriented to person, place, and time. Skin: Skin is warm. No rash noted. She is not diaphoretic. No erythema. Psychiatric: Mood and affect normal.   Nursing note and vitals reviewed. Health Maintenance Due   Topic Date Due    Shingrix Vaccine Age 50> (1 of 2) 07/13/2006    PAP AKA CERVICAL CYTOLOGY  08/02/2016    BREAST CANCER SCRN MAMMOGRAM  08/26/2016         Assessment and orders:     Encounter Diagnoses     ICD-10-CM ICD-9-CM   1. ERICKSON (nonalcoholic steatohepatitis) K75.81 571.8   2. Hypercholesterolemia E78.00 272.0   3. Lymphadenitis I88.9 289.3     Diagnoses and all orders for this visit:    1.  ERICKSON (nonalcoholic steatohepatitis)-retest  -     LIPID PANEL  - ALT+AST    2. Hypercholesterolemia-retest  -     LIPID PANEL  -     ALT+AST  -     rosuvastatin (CRESTOR) 20 mg tablet; Take 1 Tab by mouth nightly. Indications: high cholesterol, she will pick this up if her labs are okay. 3. Lymphadenitis-this is a result of recent dental work. She will contact me or the dentist if she gets worse. -     amoxicillin (AMOXIL) 500 mg capsule; Take 1 Cap by mouth three (3) times daily for 7 days. Plan of care:  Discussed diagnoses in detail with patient. Medication risks/benefits/side effects discussed with patient. All of the patient's questions were addressed. The patient understands and agrees with our plan of care. The patient knows to call back if they are unsure of or forget any changes we discussed today or if the symptoms change. The patient received an After-Visit Summary which contains VS, orders, medication list and allergy list. This can be used as a \"mini-medical record\" should they have to seek medical care while out of town. Patient Care Team:  Ulises Sears MD as PCP - Gadiel Whiting MD as PCP - [facility] Empaneled Provider  Donnie Preston MD as Physician (Gastroenterology)  Milton Pineda MD (General Surgery)    Follow-up and Dispositions    · Return in about 2 months (around 12/29/2019) for fasting. Future Appointments   Date Time Provider Vel Orr   12/18/2019  8:20 AM Ulises Sears MD [facility]AR[facility] SCHED       Signed By: Marta Guerrero MD     October 29, 2019      ATTENTION:   This medical record was transcribed using an electronic medical records/speech recognition system. Although proofread, it may and can contain electronic, spelling and other errors. Corrections may be executed at a later time. Please feel free to contact me for any clarifications as needed.

## 2019-10-30 LAB
ALT SERPL-CCNC: 30 IU/L (ref 0–32)
AST SERPL-CCNC: 30 IU/L (ref 0–40)
CHOLEST SERPL-MCNC: 146 MG/DL (ref 100–199)
HDLC SERPL-MCNC: 53 MG/DL
LDLC SERPL CALC-MCNC: 73 MG/DL (ref 0–99)
TRIGL SERPL-MCNC: 102 MG/DL (ref 0–149)
VLDLC SERPL CALC-MCNC: 20 MG/DL (ref 5–40)

## 2019-11-06 DIAGNOSIS — J45.21 REACTIVE AIRWAY DISEASE, MILD INTERMITTENT, WITH ACUTE EXACERBATION: ICD-10-CM

## 2019-11-06 RX ORDER — ROSUVASTATIN CALCIUM 20 MG/1
20 TABLET, COATED ORAL
Qty: 90 TAB | Refills: 0 | Status: SHIPPED | OUTPATIENT
Start: 2019-11-06 | End: 2020-01-20

## 2019-11-07 RX ORDER — ALBUTEROL SULFATE 90 UG/1
AEROSOL, METERED RESPIRATORY (INHALATION)
Qty: 8.5 G | Refills: 7 | Status: SHIPPED | OUTPATIENT
Start: 2019-11-07

## 2019-12-18 ENCOUNTER — OFFICE VISIT (OUTPATIENT)
Dept: FAMILY MEDICINE CLINIC | Age: 64
End: 2019-12-18

## 2019-12-18 VITALS
OXYGEN SATURATION: 94 % | WEIGHT: 250.4 LBS | BODY MASS INDEX: 40.24 KG/M2 | DIASTOLIC BLOOD PRESSURE: 71 MMHG | RESPIRATION RATE: 20 BRPM | SYSTOLIC BLOOD PRESSURE: 123 MMHG | HEIGHT: 66 IN | HEART RATE: 64 BPM | TEMPERATURE: 98 F

## 2019-12-18 DIAGNOSIS — K75.81 NASH (NONALCOHOLIC STEATOHEPATITIS): ICD-10-CM

## 2019-12-18 DIAGNOSIS — E78.00 HYPERCHOLESTEROLEMIA: ICD-10-CM

## 2019-12-18 DIAGNOSIS — I88.9 LYMPHADENITIS: Primary | ICD-10-CM

## 2019-12-18 NOTE — PATIENT INSTRUCTIONS
Watch for worsening signs of infection- redness, swelling, fever or chills. Seek care promptly should these occur.

## 2019-12-18 NOTE — PROGRESS NOTES
1. Have you been to the ER, urgent care clinic since your last visit? Hospitalized since your last visit? No    2. Have you seen or consulted any other health care providers outside of the [address] [place] [address] since your last visit? Include any pap smears or colon screening. No    Reviewed record in preparation for visit and have necessary documentation  Goals that were addressed and/or need to be completed during or after this appointment include     Health Maintenance Due   Topic Date Due    Shingrix Vaccine Age 49> (1 of 2) 07/13/2006    PAP AKA CERVICAL CYTOLOGY  08/02/2016    BREAST CANCER SCRN MAMMOGRAM  08/26/2016       Patient is accompanied by self I have received verbal consent from Evert Garcia to discuss any/all medical information while they are present in the room. mammo scheduled for Jan 2020.

## 2019-12-18 NOTE — PROGRESS NOTES
[facility], [address] [facility]  [phone]           Progress Note    Patient: Etta Desai MRN: [id number]  SSN: [id number]    YOB: 1955  Age: 62 y.o. Sex: female        Chief Complaint   Patient presents with    Labs    Hypertension         Subjective:     Encounter Diagnoses   Name Primary?  Lymphadenitis:  She has no swelling in her lymph gland. She is going to have to have a number of crowns placed. Note she has a signs and symptoms of dental related infection and she will me know if this recurs. Yes    Hypercholesterolemia: Her repeat labs on Crestor were excellent including liver functions had returned to normal.  We will recheck in 4 months.  ERICSKON (nonalcoholic steatohepatitis):  Cardiovascular risks for her are: hypertension  hyperlipidemia. Key Antihyperlipidemia Meds             rosuvastatin (CRESTOR) 20 mg tablet (Taking) Take 1 Tab by mouth nightly. Indications: high cholesterol        Lab Results   Component Value Date/Time    Cholesterol, total 146 10/29/2019 10:12 AM    HDL Cholesterol 53 10/29/2019 10:12 AM    LDL, calculated 73 10/29/2019 10:12 AM    Triglyceride 102 10/29/2019 10:12 AM    CHOL/HDL Ratio 4.2 12/04/2009 08:36 AM     Lab Results   Component Value Date/Time    ALT (SGPT) 30 10/29/2019 10:12 AM    AST (SGOT) 30 10/29/2019 10:12 AM    Alk. phosphatase 77 09/17/2019 01:48 PM    Bilirubin, total 0.6 09/17/2019 01:48 PM      Myalgias: No   Fatigue: No   Other side effects: no  Wt Readings from Last 3 Encounters:   12/18/19 250 lb 6.4 oz (113.6 kg)   10/29/19 250 lb (113.4 kg)   09/17/19 256 lb (116.1 kg)     The patient is aware of our goal to reduce or eliminate the long term problems (such as strokes and heart attacks) related to poorly controlled hyperlipidemia.             Current and past medical information:    Current Medications after this visit[de-identified]     Current Outpatient Medications   Medication Sig    levothyroxine (SYNTHROID) 50 mcg tablet TAKE 1 TABLET DAILY BEFORE BREAKFAST    PROAIR HFA 90 mcg/actuation inhaler USE 1 INHALATION EVERY 6 HOURS AS NEEDED FOR WHEEZING    rosuvastatin (CRESTOR) 20 mg tablet Take 1 Tab by mouth nightly. Indications: high cholesterol    vitamin E acetate (VITAMIN E PO) Take  by mouth.  amLODIPine (NORVASC) 10 mg tablet TAKE 1 TABLET DAILY FOR HIGH BLOOD PRESSURE    atenolol (TENORMIN) 25 mg tablet TAKE 1 TABLET TWICE A DAY    lisinopril (PRINIVIL, ZESTRIL) 40 mg tablet TAKE 1 TABLET DAILY    aspirin delayed-release 81 mg tablet Take  by mouth daily.  PV W-O TESFAYE/FERROUS FUMARATE/FA (M-VIT PO) Take  by mouth.  loratadine (CLARITIN) 10 mg tablet Take 10 mg by mouth daily.  cholecalciferol, vitamin d3, (VITAMIN D) 1,000 unit tablet Take  by mouth daily.  cyanocobalamin (VITAMIN B-12) 1,000 mcg tablet Take 1,000 mcg by mouth daily.  ondansetron hcl (ZOFRAN) 4 mg tablet     raNITIdine (ZANTAC) 150 mg tablet     hydrochlorothiazide (HYDRODIURIL) 12.5 mg tablet Take 1 Tab by mouth daily.  fish oil-dha-epa (FISH OIL) 1,200-144-216 mg Cap Take  by mouth. No current facility-administered medications for this visit.         Patient Active Problem List    Diagnosis Date Noted    Smoker 07/22/2013     Priority: 1 - One    Lymphadenitis 12/18/2019    Scalp lesion 09/17/2019    Severe obesity (BMI 35.0-39.9) 08/24/2018    Elevated blood sugar 07/15/2015    ERICKSON (nonalcoholic steatohepatitis) 07/19/2013    Hypothyroidism 11/16/2012    Colon polyps 11/13/2012    Hypertension     Irritable bowel syndrome     Hypercholesterolemia     Dysmetabolic syndrome X     Elevated LFTs     Abnormal weight gain     Anxiety        Past Medical History:   Diagnosis Date    Abnormal LFT's     Abnormal weight gain     Anxiety     Depression     Dysmetabolic syndrome X     Hypercholesterolemia     Hypertension     Irritable bowel syndrome     Thyroid disease        Allergies   Allergen Reactions    Pravachol [Pravastatin] Myalgia    Zetia [Ezetimibe] Myalgia       Past Surgical History:   Procedure Laterality Date    HX CYST REMOVAL      neck    HX OTHER SURGICAL      DNC around 26years of age   [de-identified] OTHER SURGICAL      wisdom teeth extraction       Social History     Socioeconomic History    Marital status:      Spouse name: Not on file    Number of children: Not on file    Years of education: Not on file    Highest education level: Not on file   Tobacco Use    Smoking status: Current Every Day Smoker     Packs/day: 1.00     Years: 35.00     Pack years: 35.00    Smokeless tobacco: Never Used   Substance and Sexual Activity    Alcohol use: Yes     Comment: 2 boxes of wine each week    Drug use: No    Sexual activity: Yes     Partners: Male       Review of Systems   Constitutional: Negative. Negative for chills, fever, malaise/fatigue and weight loss. HENT: Negative. Negative for hearing loss. Eyes: Negative. Negative for blurred vision and double vision. Respiratory: Negative. Negative for cough, sputum production and shortness of breath. Cardiovascular: Negative. Negative for chest pain and palpitations. Gastrointestinal: Negative. Negative for abdominal pain, blood in stool, heartburn, nausea and vomiting. Genitourinary: Negative. Negative for dysuria, frequency and urgency. Musculoskeletal: Negative. Negative for back pain, falls, myalgias and neck pain. Skin: Negative. Negative for rash. Neurological: Negative. Negative for dizziness, tingling, tremors, weakness and headaches. Endo/Heme/Allergies: Negative. Psychiatric/Behavioral: Negative. Negative for depression.         Objective:     Vitals:    12/18/19 0834   BP: 123/71   Pulse: 64   Resp: 20   Temp: 98 °F (36.7 °C)   TempSrc: Oral   SpO2: 94%   Weight: 250 lb 6.4 oz (113.6 kg)   Height: 5' 6\" (1.676 m)      Body mass index is 40.42 kg/m². Physical Exam  Vitals signs and nursing note reviewed. Constitutional:       General: She is not in acute distress. Appearance: She is not diaphoretic. HENT:      Head: Normocephalic and atraumatic. Eyes:      Conjunctiva/sclera: Conjunctivae normal.   Neck:      Thyroid: No thyromegaly. Comments: No carotid bruit. Cardiovascular:      Rate and Rhythm: Normal rate and regular rhythm. Heart sounds: No murmur. No friction rub. No gallop. Pulmonary:      Effort: Pulmonary effort is normal. No respiratory distress. Breath sounds: Normal breath sounds. No wheezing or rales. Abdominal:      General: There is no distension. Palpations: Abdomen is soft. Skin:     General: Skin is warm. Findings: No erythema or rash. Neurological:      Mental Status: She is alert and oriented to person, place, and time. Psychiatric:         Mood and Affect: Mood and affect normal.        Health Maintenance Due   Topic Date Due    Shingrix Vaccine Age 50> (1 of 2) 07/13/2006    PAP AKA CERVICAL CYTOLOGY  08/02/2016    BREAST CANCER SCRN MAMMOGRAM  08/26/2016         Assessment and orders:     Encounter Diagnoses     ICD-10-CM ICD-9-CM   1. Lymphadenitis I88.9 289.3   2. Hypercholesterolemia E78.00 272.0   3. ERICKSON (nonalcoholic steatohepatitis) K75.81 571.8     Diagnoses and all orders for this visit:    1. Lymphadenitis-resolved. Further dental work needs to be done. 2. Hypercholesterolemia-extremely good on Crestor    3. ERICKSON (nonalcoholic steatohepatitis)-liver function tests went down on Crestor. Retest again in 4 months. Plan of care:  Discussed diagnoses in detail with patient. Medication risks/benefits/side effects discussed with patient. All of the patient's questions were addressed. The patient understands and agrees with our plan of care.     The patient knows to call back if they are unsure of or forget any changes we discussed today or if the symptoms change. The patient received an After-Visit Summary which contains VS, orders, medication list and allergy list. This can be used as a \"mini-medical record\" should they have to seek medical care while out of town. Patient Care Team:  Yvette Chaparro MD as PCP - Luh Sigala MD as PCP - [facility] Provider  Naomi Mackenzie MD as Physician (Gastroenterology)  Lorri Manning MD (General Surgery)    Follow-up and Dispositions    · Return in about 4 months (around 4/18/2020), or if symptoms worsen or fail to improve. Future Appointments   Date Time Provider Vel Dominga   4/21/2020  8:00 AM Yvette Chaparro MD [facility]AR[facility] SCHED       Signed By: Nicolas Peres MD     December 18, 2019      ATTENTION:   This medical record was transcribed using an electronic medical records/speech recognition system. Although proofread, it may and can contain electronic, spelling and other errors. Corrections may be executed at a later time. Please feel free to contact me for any clarifications as needed.

## 2020-01-22 ENCOUNTER — TELEPHONE (OUTPATIENT)
Dept: FAMILY MEDICINE CLINIC | Age: 65
End: 2020-01-22

## 2020-01-22 DIAGNOSIS — Z12.39 BREAST CANCER SCREENING, HIGH RISK PATIENT: Primary | ICD-10-CM

## 2020-01-22 NOTE — TELEPHONE ENCOUNTER
Mayela Lane from [facility] called requesting an order for a diagnostic bilateral mammogram with ultrasound. Patient is coming in on 2/5/2020 to have this done.  Fax #[phone]

## 2020-01-29 ENCOUNTER — DOCUMENTATION ONLY (OUTPATIENT)
Dept: FAMILY MEDICINE CLINIC | Age: 65
End: 2020-01-29

## 2020-01-29 NOTE — PROGRESS NOTES
Received fax confirmation that mammogram and ultrasound scheduled at [facility] for 2/5/2020 at 9:30

## 2020-10-13 ENCOUNTER — CLINICAL SUPPORT (OUTPATIENT)
Dept: FAMILY MEDICINE CLINIC | Age: 65
End: 2020-10-13
Payer: COMMERCIAL

## 2020-10-13 VITALS — TEMPERATURE: 97.9 F

## 2020-10-13 DIAGNOSIS — Z23 ENCOUNTER FOR IMMUNIZATION: Primary | ICD-10-CM

## 2020-10-13 PROCEDURE — 90471 IMMUNIZATION ADMIN: CPT | Performed by: FAMILY MEDICINE

## 2020-10-13 PROCEDURE — 90686 IIV4 VACC NO PRSV 0.5 ML IM: CPT | Performed by: FAMILY MEDICINE

## 2020-10-14 ENCOUNTER — VIRTUAL VISIT (OUTPATIENT)
Dept: FAMILY MEDICINE CLINIC | Age: 65
End: 2020-10-14
Payer: COMMERCIAL

## 2020-10-14 DIAGNOSIS — I10 ESSENTIAL HYPERTENSION: ICD-10-CM

## 2020-10-14 DIAGNOSIS — F17.200 SMOKER: ICD-10-CM

## 2020-10-14 DIAGNOSIS — E03.9 HYPOTHYROIDISM, UNSPECIFIED TYPE: ICD-10-CM

## 2020-10-14 DIAGNOSIS — E78.00 HYPERCHOLESTEROLEMIA: Primary | ICD-10-CM

## 2020-10-14 DIAGNOSIS — R73.01 IFG (IMPAIRED FASTING GLUCOSE): ICD-10-CM

## 2020-10-14 PROCEDURE — 99443 PR PHYS/QHP TELEPHONE EVALUATION 21-30 MIN: CPT | Performed by: FAMILY MEDICINE

## 2020-10-14 NOTE — PROGRESS NOTES
Chief Complaint   Patient presents with   [facility] Medication Evaluation     1. Have you been to the ER, urgent care clinic since your last visit? Hospitalized since your last visit? No    2. Have you seen or consulted any other health care providers outside of the [address] [place] [address] since your last visit? Include any pap smears or colon screening.  No    Reviewed record in preparation for visit and have necessary documentation  Pt did not bring medication to office visit for review  Information was given to pt on Advanced Directives, Living Will  Information was given on Shingles Vaccine  Opportunity was given for questions  Goals that were addressed and/or need to be completed after this appointment include:     Health Maintenance Due   Topic Date Due    Shingrix Vaccine Age 50> (1 of 2) 07/13/2006    PAP AKA CERVICAL CYTOLOGY  08/02/2016

## 2020-10-14 NOTE — PROGRESS NOTES
Brandie Vázquez is a 60 y.o. female evaluated via telephone on 10/14/20. Patient Identity confirmed by . Telephone encounter done in lieu of office visit due to extraordinary circumstances. A state of national and state emergency has been declared by the President and the [place] due to the [employer] pandemic. Pursuant to the emergency declaration under the [facility] [address] authority and the [employer] and [employer] Act, this Virtual  Visit was conducted, with patient's consent, to reduce the patient's risk of exposure to COVID-19 and provide continuity of care for an established patient. Ilana Jaramillo LPN coordinated virtual visit    Consent:  Patient and/or health care decision maker is aware that he/she may receive a bill for this telephone encounter, depending on his insurance coverage, and has provided verbal consent to proceed: Yes    Physician Location: Office  Patient Location: Home    CC: establish care. Information gathered from patient and/or health care decision maker. HPI: Brandie Vázquez is a 60 y.o. female who was evaluated by synchronous (real-time) audio technology from his/her home. Patient scheduled to establish care. Her PCP has retired. Patient with hx of HTN, HLD, ERICKSON, obesity and tobacco abuse. She is due for lab work. Patient feeling good sans new complaints or concerns at this time. Encounter Diagnoses   Name Primary?     Hypercholesterolemia Yes    Essential hypertension     Hypothyroidism, unspecified type     IFG (impaired fasting glucose)     Smoker          Current Outpatient Medications:     lisinopriL (PRINIVIL, ZESTRIL) 40 mg tablet, TAKE 1 TABLET DAILY, Disp: 90 Tab, Rfl: 2    atenoloL (TENORMIN) 25 mg tablet, TAKE 1 TABLET TWICE A DAY, Disp: 180 Tab, Rfl: 2    amLODIPine (NORVASC) 10 mg tablet, TAKE 1 TABLET DAILY FOR HIGH BLOOD PRESSURE, Disp: 90 Tab, Rfl: 2    rosuvastatin (CRESTOR) 20 mg tablet, TAKE 1 TABLET NIGHTLY (HIGH CHOLESTEROL), Disp: 90 Tab, Rfl: 2    levothyroxine (SYNTHROID) 50 mcg tablet, TAKE 1 TABLET DAILY BEFORE BREAKFAST, Disp: 90 Tab, Rfl: 3    PROAIR HFA 90 mcg/actuation inhaler, USE 1 INHALATION EVERY 6 HOURS AS NEEDED FOR WHEEZING, Disp: 8.5 g, Rfl: 7    vitamin E acetate (VITAMIN E PO), Take  by mouth., Disp: , Rfl:     aspirin delayed-release 81 mg tablet, Take  by mouth daily. , Disp: , Rfl:     PV W-O TESFAYE/FERROUS FUMARATE/FA (M-VIT PO), Take  by mouth., Disp: , Rfl:     loratadine (CLARITIN) 10 mg tablet, Take 10 mg by mouth daily. , Disp: , Rfl:     cholecalciferol, vitamin d3, (VITAMIN D) 1,000 unit tablet, Take  by mouth daily. , Disp: , Rfl:     cyanocobalamin (VITAMIN B-12) 1,000 mcg tablet, Take 1,000 mcg by mouth daily. , Disp: , Rfl:     fish oil-dha-epa (FISH OIL) 1,200-144-216 mg Cap, Take  by mouth., Disp: , Rfl:      Allergies   Allergen Reactions    Pravachol [Pravastatin] Myalgia    Zetia [Ezetimibe] Myalgia        Patient Active Problem List    Diagnosis Date Noted    Lymphadenitis 12/18/2019    Scalp lesion 09/17/2019    Severe obesity (BMI 35.0-39.9) 08/24/2018    Elevated blood sugar 07/15/2015    Smoker 07/22/2013    ERICKSON (nonalcoholic steatohepatitis) 07/19/2013    Hypothyroidism 11/16/2012    Colon polyps 11/13/2012    Hypertension     Irritable bowel syndrome     Hypercholesterolemia     Dysmetabolic syndrome X     Elevated LFTs     Abnormal weight gain     Anxiety         Review of Systems:  Constitutional: Negative for fatigue, malaise  Resp: Negative for cough, wheezing or SOB  CV: Negative for chest pain, dizziness or palpitations  GI: Negative for nausea or abdominal pain  MS: Negative for acute myalgias or arthralgias   Neuro: Negative for HA, weakness or paresthesia  Psych: Negative for depression or anxiety       Assessment/Plan:  Details of this discussion including any medical advice provided: Patient advised as a precaution to stay at home, practice regular hand washing with soap and warm water and to wear a mask and utilize social distancing when necessary to be out in public places. ICD-10-CM ICD-9-CM    1. Hypercholesterolemia  E78.00 272.0 LIPID PANEL      METABOLIC PANEL, COMPREHENSIVE   2. Essential hypertension  V87 302.6 METABOLIC PANEL, COMPREHENSIVE      HGB & HCT   3. Hypothyroidism, unspecified type  E03.9 244.9 TSH 3RD GENERATION   4. IFG (impaired fasting glucose)  R73.01 790.21 HEMOGLOBIN A1C WITH EAG   5. Smoker  F17.200 305.1      Continue current prescribed medications as written. No medication changes at this time. Symptomatic therapy suggested: call prn if symptoms persist or worsen. Total Time: minutes: 21-30 minutes was spent on phone discussing above problems and plan. Patient medical history, prior OV notes, vitals flow sheet, lab results, medications, and allergies were reviewed during this encounter. For phone encounters:  I affirm this is a patient initiated episode with an established Patient who has not had a related appointment within my department in the past 7 days or scheduled within the next 24 hours. Note: not billable if this call serves to triage the patient into an appointment for the relevant concern    Follow-up and Dispositions    · Return in about 3 months (around 1/14/2021), or if symptoms worsen or fail to improve.          MD LUCIANO Rodriguez & ANIL MORALES [facility]  10/14/20

## 2020-11-25 RX ORDER — LEVOTHYROXINE SODIUM 50 UG/1
TABLET ORAL
Qty: 90 TAB | Refills: 3 | Status: SHIPPED | OUTPATIENT
Start: 2020-11-25 | End: 2021-11-22

## 2021-08-31 ENCOUNTER — OFFICE VISIT (OUTPATIENT)
Dept: FAMILY MEDICINE CLINIC | Age: 66
End: 2021-08-31
Payer: MEDICARE

## 2021-08-31 VITALS
TEMPERATURE: 98 F | HEART RATE: 60 BPM | OXYGEN SATURATION: 95 % | DIASTOLIC BLOOD PRESSURE: 83 MMHG | WEIGHT: 258 LBS | RESPIRATION RATE: 18 BRPM | HEIGHT: 66 IN | SYSTOLIC BLOOD PRESSURE: 120 MMHG | BODY MASS INDEX: 41.46 KG/M2

## 2021-08-31 DIAGNOSIS — R73.01 IFG (IMPAIRED FASTING GLUCOSE): ICD-10-CM

## 2021-08-31 DIAGNOSIS — E78.00 HYPERCHOLESTEROLEMIA: ICD-10-CM

## 2021-08-31 DIAGNOSIS — I10 ESSENTIAL HYPERTENSION: Primary | ICD-10-CM

## 2021-08-31 DIAGNOSIS — Z00.00 WELCOME TO MEDICARE PREVENTIVE VISIT: ICD-10-CM

## 2021-08-31 DIAGNOSIS — L57.8 SUN-DAMAGED SKIN: ICD-10-CM

## 2021-08-31 DIAGNOSIS — E03.9 HYPOTHYROIDISM, UNSPECIFIED TYPE: ICD-10-CM

## 2021-08-31 PROCEDURE — 3017F COLORECTAL CA SCREEN DOC REV: CPT | Performed by: FAMILY MEDICINE

## 2021-08-31 PROCEDURE — 1090F PRES/ABSN URINE INCON ASSESS: CPT | Performed by: FAMILY MEDICINE

## 2021-08-31 PROCEDURE — G0403 EKG FOR INITIAL PREVENT EXAM: HCPCS | Performed by: FAMILY MEDICINE

## 2021-08-31 PROCEDURE — G0402 INITIAL PREVENTIVE EXAM: HCPCS | Performed by: FAMILY MEDICINE

## 2021-08-31 PROCEDURE — G8400 PT W/DXA NO RESULTS DOC: HCPCS | Performed by: FAMILY MEDICINE

## 2021-08-31 PROCEDURE — G8752 SYS BP LESS 140: HCPCS | Performed by: FAMILY MEDICINE

## 2021-08-31 PROCEDURE — G8427 DOCREV CUR MEDS BY ELIG CLIN: HCPCS | Performed by: FAMILY MEDICINE

## 2021-08-31 PROCEDURE — G8536 NO DOC ELDER MAL SCRN: HCPCS | Performed by: FAMILY MEDICINE

## 2021-08-31 PROCEDURE — G8754 DIAS BP LESS 90: HCPCS | Performed by: FAMILY MEDICINE

## 2021-08-31 PROCEDURE — 1101F PT FALLS ASSESS-DOCD LE1/YR: CPT | Performed by: FAMILY MEDICINE

## 2021-08-31 PROCEDURE — 99214 OFFICE O/P EST MOD 30 MIN: CPT | Performed by: FAMILY MEDICINE

## 2021-08-31 PROCEDURE — G9899 SCRN MAM PERF RSLTS DOC: HCPCS | Performed by: FAMILY MEDICINE

## 2021-08-31 PROCEDURE — G8417 CALC BMI ABV UP PARAM F/U: HCPCS | Performed by: FAMILY MEDICINE

## 2021-08-31 PROCEDURE — G8510 SCR DEP NEG, NO PLAN REQD: HCPCS | Performed by: FAMILY MEDICINE

## 2021-08-31 NOTE — PROGRESS NOTES
Progress Note    Patient: Vick Bruno MRN: [id number]  SSN: [id number]    YOB: 1955  Age: 73 y.o. Sex: female        Chief Complaint   Patient presents with    Follow Up Chronic Condition    Skin Problem     moles-back, left breast (2)     she is a 73y.o. year old female who presents for follow up of chronic health conditions. Patient with hx of HTN, HLD, ERICKSON, obesity and tobacco abuse. She is due for lab work. . 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 She is concerned about pigmented lesions on her skin. She has hx of sun exposure and sun damaged skin. Patient denies HA, dizziness, SOB, CP, abdominal pain, dysuria, acute myalgias or arthralgias. Encounter Diagnoses   Name Primary?  Essential hypertension Yes    Hypercholesterolemia     Hypothyroidism, unspecified type     IFG (impaired fasting glucose)     Sun-damaged skin        Patient Active Problem List   Diagnosis Code    Hypertension I10    Irritable bowel syndrome K58.9    Hypercholesterolemia I51.24    Dysmetabolic syndrome X Q35.81    Elevated LFTs R79.89    Abnormal weight gain R63.5    Anxiety F41.9    Colon polyps K63.5    Hypothyroidism E03.9    ERICKSON (nonalcoholic steatohepatitis) K75.81    Smoker F17.200    Elevated blood sugar R73.9    Severe obesity (BMI 35.0-39. 9) E66.01    Scalp lesion L98.9    Lymphadenitis I88.9     Past Surgical History:   Procedure Laterality Date    HX CYST REMOVAL      neck    HX OTHER SURGICAL      DNC around 26years of age   Burtis Brochure OTHER SURGICAL      wisdom teeth extraction     Social History     Socioeconomic History    Marital status:      Spouse name: Not on file    Number of children: Not on file    Years of education: Not on file    Highest education level: Not on file   Occupational History    Not on file   Tobacco Use    Smoking status: Current Every Day Smoker     Packs/day: 1.00     Years: 35.00     Pack years: 35.00    Smokeless tobacco: Never Used   Substance and Sexual Activity    Alcohol use: Yes     Comment: 2 boxes of wine each week    Drug use: No    Sexual activity: Yes     Partners: Male   Other Topics Concern    Not on file   Social History Narrative    Not on file     Social Determinants of Health     Financial Resource Strain:     Difficulty of Paying Living Expenses:    Food Insecurity:     Worried About Running Out of Food in the Last Year:     [address] in the Last Year:    Transportation Needs:     Lack of Transportation (Medical):  Lack of Transportation (Non-Medical):    Physical Activity:     Days of Exercise per Week:     Minutes of Exercise per Session:    Stress:     Feeling of Stress :    Social Connections:     Frequency of Communication with Friends and Family:     Frequency of Social Gatherings with Friends and Family:     Attends Gnosticist Services:     Active Member of Clubs or Organizations:     Attends Club or Organization Meetings:     Marital Status:    Intimate Partner Violence:     Fear of Current or Ex-Partner:     Emotionally Abused:     Physically Abused:     Sexually Abused:      Family History   Problem Relation Age of Onset    COPD Mother     Hypertension Mother     Other Mother         congestive heart failure    Hypertension Father     Stroke Father     Heart Disease Father     Other Father         COPD     Current Outpatient Medications   Medication Sig    pneumococcal 23-valent (PNEUMOVAX 23) 25 mcg/0.5 mL injection 0.5 mL by IntraMUSCular route once for 1 dose.     amLODIPine (NORVASC) 10 mg tablet TAKE 1 TABLET DAILY FOR HIGH BLOOD PRESSURE    lisinopriL (PRINIVIL, ZESTRIL) 40 mg tablet TAKE 1 TABLET DAILY    atenoloL (TENORMIN) 25 mg tablet TAKE 1 TABLET TWICE A DAY    rosuvastatin (CRESTOR) 20 mg tablet TAKE 1 TABLET NIGHTLY FOR HIGH CHOLESTEROL    levothyroxine (SYNTHROID) 50 mcg tablet TAKE 1 TABLET DAILY BEFORE BREAKFAST    PROAIR HFA 90 mcg/actuation inhaler USE 1 INHALATION EVERY 6 HOURS AS NEEDED FOR WHEEZING    vitamin E acetate (VITAMIN E PO) Take  by mouth.  aspirin delayed-release 81 mg tablet Take  by mouth daily.  PV W-O TESFAYE/FERROUS FUMARATE/FA (M-VIT PO) Take  by mouth.  loratadine (CLARITIN) 10 mg tablet Take 10 mg by mouth daily.  fish oil-dha-epa (FISH OIL) 1,200-144-216 mg Cap Take  by mouth.  cholecalciferol, vitamin d3, (VITAMIN D) 1,000 unit tablet Take  by mouth daily.  cyanocobalamin (VITAMIN B-12) 1,000 mcg tablet Take 1,000 mcg by mouth daily. No current facility-administered medications for this visit. Allergies   Allergen Reactions    Pravachol [Pravastatin] Myalgia    Zetia [Ezetimibe] Myalgia       Review of Systems:  Constitutional: Negative for fatigue, malaise  Resp: Negative for cough, wheezing or SOB  CV: Negative for chest pain, dizziness or palpitations  GI: Negative for nausea or abdominal pain  MS: Negative for acute myalgias or arthralgias   Neuro: Negative for HA, weakness or paresthesia  Psych: see PHI    Vitals:    08/31/21 0858   BP: 120/83   Pulse: 60   Resp: 18   Temp: 98 °F (36.7 °C)   TempSrc: Oral   SpO2: 95%   Weight: 258 lb (117 kg)   Height: 5' 6\" (1.676 m)       Physical Examination:  General: Well developed, well nourished, in no acute distress  Skin: seborrheic keratoses, sun damaged skin  Head: Normocephalic, atraumatic  Eyes: Sclera clear, EOMI  Neck: Normal range of motion  Respiratory: Symmetrical, unlabored effort  Cardiovascular: Regular rate and rhythm  Extremities: Full range of motion, normal gait  Neurologic: No focal deficits  Psych: Active, alert and oriented. Affect appropriate       ICD-10-CM ICD-9-CM    1. Essential hypertension  D40 507.2 METABOLIC PANEL, COMPREHENSIVE      CBC W/O DIFF      AMB POC EKG ROUTINE W/ 12 LEADS, INTER & REP      CBC W/O DIFF      METABOLIC PANEL, COMPREHENSIVE   2.  Hypercholesterolemia  E78.00 272.0 LIPID PANEL      METABOLIC PANEL, COMPREHENSIVE      METABOLIC PANEL, COMPREHENSIVE      LIPID PANEL   3. Hypothyroidism, unspecified type  E03.9 244.9 TSH 3RD GENERATION      T4, FREE      T4, FREE      TSH 3RD GENERATION   4. IFG (impaired fasting glucose)  R73.01 790.21 HEMOGLOBIN A1C WITH EAG      HEMOGLOBIN A1C WITH EAG   5. Sun-damaged skin  L57.8 692.79 REFERRAL TO DERMATOLOGY       Plan of care:  Diagnoses were discussed in detail with patient. Medications reviewed and appropriate. Patient to continue current prescribed medications as written. Medication risks/benefits/side effects discussed with patient. All of the patient's questions were addressed and answered to apparent satisfaction. The patient understands and agrees with our plan of care. The patient knows to call back if they have questions about the plan of care or if symptoms change. The patient received an After-Visit Summary which contains VS, diagnoses, orders, allergy and medication lists. No future appointments. The following Welcome [facility] Exam is distinct and separate from the medical evaluation and decision making. This is a \"Welcome to [employer]"  Initial Preventive Physical Examination (IPPE) providing Personalized Prevention Plan Services (Performed in the first 12 months of enrollment)    I have reviewed the patient's medical history in detail and updated the computerized patient record. Assessment/Plan   Education and counseling provided:  Are appropriate based on today's review and evaluation  End-of-Life planning (with patient's consent)    1.  Welcome to Medicare preventive visit       Depression Risk Screen     3 most recent PHQ Screens 8/31/2021   Little interest or pleasure in doing things Not at all   Feeling down, depressed, irritable, or hopeless Not at all   Total Score PHQ 2 0       Alcohol Risk Screen    Do you average more than 1 drink per night or more than 7 drinks a week:  Yes    On any one occasion in the past three months have you have had more than 3 drinks containing alcohol:  Yes          Functional Ability and Level of Safety    Diet: No special diet      Hearing: Hearing is good. Vision Screening:  Vision is good. No exam data present      Activities of Daily Living: The home contains: no safety equipment. Patient does total self care      Ambulation: with no difficulty      Exercise level: sedentary     Fall Risk Screen:  Fall Risk Assessment, last 12 mths 8/31/2021   Able to walk? Yes   Fall in past 12 months? 0   Do you feel unsteady? 0   Are you worried about falling 0      Abuse Screen:  Patient is not abused       Screening EKG   EKG order placed: Yes    End of Life Planning   Advanced care planning directives were discussed with the patient and /or family/caregiver. Health Maintenance Due     Health Maintenance Due   Topic Date Due    Pneumococcal 65+ years (1 of 1 - PPSV23) 07/13/2021    Medicare Yearly Exam  08/31/2021       Patient Care Team   Patient Care Team:  Chasity Ford MD as PCP - General (Family Medicine)  Chasity Ford MD as PCP - [address] [place] [address] Provider  Andre Mcginnis MD as Physician (Gastroenterology)  Marisol Faria MD (General Surgery)    History     Past Medical History:   Diagnosis Date    Abnormal LFT's     Abnormal weight gain     Anxiety     Depression     Dysmetabolic syndrome X     Hypercholesterolemia     Hypertension     Irritable bowel syndrome     Thyroid disease       Past Surgical History:   Procedure Laterality Date    HX CYST REMOVAL      neck    HX OTHER SURGICAL      DNC around 26years of age   Jesse Galla HX OTHER SURGICAL      wisdom teeth extraction     Current Outpatient Medications   Medication Sig Dispense Refill    pneumococcal 23-valent (PNEUMOVAX 23) 25 mcg/0.5 mL injection 0.5 mL by IntraMUSCular route once for 1 dose.  0.5 mL 0    amLODIPine (NORVASC) 10 mg tablet TAKE 1 TABLET DAILY FOR HIGH BLOOD PRESSURE 90 Tablet 0  lisinopriL (PRINIVIL, ZESTRIL) 40 mg tablet TAKE 1 TABLET DAILY 90 Tablet 0    atenoloL (TENORMIN) 25 mg tablet TAKE 1 TABLET TWICE A  Tablet 0    rosuvastatin (CRESTOR) 20 mg tablet TAKE 1 TABLET NIGHTLY FOR HIGH CHOLESTEROL 90 Tab 1    levothyroxine (SYNTHROID) 50 mcg tablet TAKE 1 TABLET DAILY BEFORE BREAKFAST 90 Tab 3    PROAIR HFA 90 mcg/actuation inhaler USE 1 INHALATION EVERY 6 HOURS AS NEEDED FOR WHEEZING 8.5 g 7    vitamin E acetate (VITAMIN E PO) Take  by mouth.  aspirin delayed-release 81 mg tablet Take  by mouth daily.  PV W-O TESFAYE/FERROUS FUMARATE/FA (M-VIT PO) Take  by mouth.  loratadine (CLARITIN) 10 mg tablet Take 10 mg by mouth daily.  fish oil-dha-epa (FISH OIL) 1,200-144-216 mg Cap Take  by mouth.  cholecalciferol, vitamin d3, (VITAMIN D) 1,000 unit tablet Take  by mouth daily.  cyanocobalamin (VITAMIN B-12) 1,000 mcg tablet Take 1,000 mcg by mouth daily.        Allergies   Allergen Reactions    Pravachol [Pravastatin] Myalgia    Zetia [Ezetimibe] Myalgia       Family History   Problem Relation Age of Onset   Nubia Manual COPD Mother     Hypertension Mother     Other Mother         congestive heart failure    Hypertension Father     Stroke Father     Heart Disease Father     Other Father         COPD     Social History     Tobacco Use    Smoking status: Current Every Day Smoker     Packs/day: 1.00     Years: 35.00     Pack years: 35.00    Smokeless tobacco: Never Used   Substance Use Topics    Alcohol use: Yes     Comment: 2 boxes of wine each week       Albert Pichardo MD

## 2021-08-31 NOTE — PROGRESS NOTES
1. Have you been to the ER, urgent care clinic since your last visit? Hospitalized since your last visit? No    2. Have you seen or consulted any other health care providers outside of the [address] [place] [address] since your last visit? Include any pap smears or colon screening. No    Reviewed record in preparation for visit and have necessary documentation  Goals that were addressed and/or need to be completed during or after this appointment include     Health Maintenance Due   Topic Date Due    Shingrix Vaccine Age 50> (1 of 2) Never done    Low dose CT lung screening  Never done    Bone Densitometry (Dexa) Screening  Never done    Pneumococcal 65+ years (1 of 1 - PPSV23) 07/13/2021       Patient is accompanied by self I have received verbal consent from Rodolfo Gray to discuss any/all medical information while they are present in the room.

## 2021-08-31 NOTE — PATIENT INSTRUCTIONS
Medicare Wellness Visit, Female     The best way to live healthy is to have a lifestyle where you eat a well-balanced diet, exercise regularly, limit alcohol use, and quit all forms of tobacco/nicotine, if applicable. Regular preventive services are another way to keep healthy. Preventive services (vaccines, screening tests, monitoring & exams) can help personalize your care plan, which helps you manage your own care. Screening tests can find health problems at the earliest stages, when they are easiest to treat. [facility] follows the current, evidence-based guidelines published by the [facility] Hussain Masters ([facility]) when recommending preventive services for our patients. Because we follow these guidelines, sometimes recommendations change over time as research supports it. (For example, mammograms used to be recommended annually. Even though Medicare will still pay for an annual mammogram, the newer guidelines recommend a mammogram every two years for women of average risk). Of course, you and your doctor may decide to screen more often for some diseases, based on your risk and your co-morbidities (chronic disease you are already diagnosed with). Preventive services for you include:  - Medicare offers their members a free annual wellness visit, which is time for you and your primary care provider to discuss and plan for your preventive service needs. Take advantage of this benefit every year!  -All adults over the age of 72 should receive the recommended pneumonia vaccines. Current USPSTF guidelines recommend a series of two vaccines for the best pneumonia protection.   -All adults should have a flu vaccine yearly and a tetanus vaccine every 10 years.   -All adults age 48 and older should receive the shingles vaccines (series of two vaccines).       -All adults age 38-68 who are overweight should have a diabetes screening test once every three years.   -All adults born between [birth year] and 1965 should be screened once for Hepatitis C.  -Other screening tests and preventive services for persons with diabetes include: an eye exam to screen for diabetic retinopathy, a kidney function test, a foot exam, and stricter control over your cholesterol.   -Cardiovascular screening for adults with routine risk involves an electrocardiogram (ECG) at intervals determined by your doctor.   -Colorectal cancer screenings should be done for adults age 54-65 with no increased risk factors for colorectal cancer. There are a number of acceptable methods of screening for this type of cancer. Each test has its own benefits and drawbacks. Discuss with your doctor what is most appropriate for you during your annual wellness visit. The different tests include: colonoscopy (considered the best screening method), a fecal occult blood test, a fecal DNA test, and sigmoidoscopy.    -A bone mass density test is recommended when a woman turns 65 to screen for osteoporosis. This test is only recommended one time, as a screening. Some providers will use this same test as a disease monitoring tool if you already have osteoporosis. -Breast cancer screenings are recommended every other year for women of normal risk, age 54-69.  -Cervical cancer screenings for women over age 72 are only recommended with certain risk factors.      Here is a list of your current Health Maintenance items (your personalized list of preventive services) with a due date:  Health Maintenance Due   Topic Date Due    Pneumococcal Vaccine (1 of 1 - PPSV23) 07/13/2021    Annual Well Visit  08/31/2021

## 2021-09-01 LAB
ALBUMIN SERPL-MCNC: 3.5 G/DL (ref 3.5–5)
ALBUMIN/GLOB SERPL: 0.9 {RATIO} (ref 1.1–2.2)
ALP SERPL-CCNC: 78 U/L (ref 45–117)
ALT SERPL-CCNC: 41 U/L (ref 12–78)
ANION GAP SERPL CALC-SCNC: 3 MMOL/L (ref 5–15)
AST SERPL-CCNC: 29 U/L (ref 15–37)
BILIRUB SERPL-MCNC: 0.5 MG/DL (ref 0.2–1)
BUN SERPL-MCNC: 10 MG/DL (ref 6–20)
BUN/CREAT SERPL: 21 (ref 12–20)
CALCIUM SERPL-MCNC: 8.7 MG/DL (ref 8.5–10.1)
CHLORIDE SERPL-SCNC: 107 MMOL/L (ref 97–108)
CHOLEST SERPL-MCNC: 152 MG/DL
CO2 SERPL-SCNC: 28 MMOL/L (ref 21–32)
CREAT SERPL-MCNC: 0.48 MG/DL (ref 0.55–1.02)
ERYTHROCYTE [DISTWIDTH] IN BLOOD BY AUTOMATED COUNT: 13.4 % (ref 11.5–14.5)
EST. AVERAGE GLUCOSE BLD GHB EST-MCNC: 131 MG/DL
GLOBULIN SER CALC-MCNC: 3.7 G/DL (ref 2–4)
GLUCOSE SERPL-MCNC: 118 MG/DL (ref 65–100)
HBA1C MFR BLD: 6.2 % (ref 4–5.6)
HCT VFR BLD AUTO: 44.2 % (ref 35–47)
HDLC SERPL-MCNC: 64 MG/DL
HDLC SERPL: 2.4 {RATIO} (ref 0–5)
HGB BLD-MCNC: 13.7 G/DL (ref 11.5–16)
LDLC SERPL CALC-MCNC: 65.6 MG/DL (ref 0–100)
MCH RBC QN AUTO: 33.7 PG (ref 26–34)
MCHC RBC AUTO-ENTMCNC: 31 G/DL (ref 30–36.5)
MCV RBC AUTO: 108.6 FL (ref 80–99)
NRBC # BLD: 0 K/UL (ref 0–0.01)
NRBC BLD-RTO: 0 PER 100 WBC
PLATELET # BLD AUTO: 233 K/UL (ref 150–400)
PMV BLD AUTO: 11 FL (ref 8.9–12.9)
POTASSIUM SERPL-SCNC: 4.4 MMOL/L (ref 3.5–5.1)
PROT SERPL-MCNC: 7.2 G/DL (ref 6.4–8.2)
RBC # BLD AUTO: 4.07 M/UL (ref 3.8–5.2)
SODIUM SERPL-SCNC: 138 MMOL/L (ref 136–145)
T4 FREE SERPL-MCNC: 0.9 NG/DL (ref 0.8–1.5)
TRIGL SERPL-MCNC: 112 MG/DL (ref ?–150)
TSH SERPL DL<=0.05 MIU/L-ACNC: 2.67 UIU/ML (ref 0.36–3.74)
VLDLC SERPL CALC-MCNC: 22.4 MG/DL
WBC # BLD AUTO: 5.6 K/UL (ref 3.6–11)

## 2021-11-22 RX ORDER — LEVOTHYROXINE SODIUM 50 UG/1
TABLET ORAL
Qty: 90 TABLET | Refills: 1 | Status: SHIPPED | OUTPATIENT
Start: 2021-11-22 | End: 2022-05-24

## 2021-11-23 RX ORDER — LISINOPRIL 40 MG/1
TABLET ORAL
Qty: 90 TABLET | Refills: 3 | Status: SHIPPED | OUTPATIENT
Start: 2021-11-23

## 2021-11-23 RX ORDER — AMLODIPINE BESYLATE 10 MG/1
TABLET ORAL
Qty: 90 TABLET | Refills: 3 | Status: SHIPPED | OUTPATIENT
Start: 2021-11-23

## 2021-11-23 RX ORDER — ATENOLOL 25 MG/1
TABLET ORAL
Qty: 180 TABLET | Refills: 3 | Status: SHIPPED | OUTPATIENT
Start: 2021-11-23

## 2022-03-19 PROBLEM — I88.9 LYMPHADENITIS: Status: ACTIVE | Noted: 2019-12-18

## 2022-03-19 PROBLEM — L98.9 SCALP LESION: Status: ACTIVE | Noted: 2019-09-17

## 2022-08-05 ENCOUNTER — TELEPHONE (OUTPATIENT)
Dept: FAMILY MEDICINE CLINIC | Age: 67
End: 2022-08-05

## 2022-08-05 NOTE — TELEPHONE ENCOUNTER
----- Message from Ana Angela sent at 8/5/2022  2:04 PM EDT -----  Subject: Referral Request    Reason for referral request? PT annual physical scheduled 8/23. Needs labs   scheduled  Provider patient wants to be referred to(if known):     Provider Phone Number(if known):     Additional Information for Provider?   ---------------------------------------------------------------------------  --------------  [address] [facility]    [phone]; OK to leave message on voicemail  ---------------------------------------------------------------------------  --------------

## 2022-08-09 NOTE — TELEPHONE ENCOUNTER
Patient is past due for lab work. We no longer carry the pneumonia vaccine. Flu vaccine may not yet be available at time of scheduled OV. Lab tests for Lyme generally equivocal. She needs treatment.

## 2022-08-23 ENCOUNTER — OFFICE VISIT (OUTPATIENT)
Dept: FAMILY MEDICINE CLINIC | Age: 67
End: 2022-08-23
Payer: MEDICARE

## 2022-08-23 VITALS
DIASTOLIC BLOOD PRESSURE: 76 MMHG | WEIGHT: 257.6 LBS | HEIGHT: 66 IN | TEMPERATURE: 98.2 F | SYSTOLIC BLOOD PRESSURE: 119 MMHG | BODY MASS INDEX: 41.4 KG/M2 | HEART RATE: 55 BPM | OXYGEN SATURATION: 97 % | RESPIRATION RATE: 16 BRPM

## 2022-08-23 DIAGNOSIS — R73.01 IFG (IMPAIRED FASTING GLUCOSE): ICD-10-CM

## 2022-08-23 DIAGNOSIS — E78.00 HYPERCHOLESTEROLEMIA: ICD-10-CM

## 2022-08-23 DIAGNOSIS — E03.9 HYPOTHYROIDISM, UNSPECIFIED TYPE: ICD-10-CM

## 2022-08-23 DIAGNOSIS — S50.862A TICK BITE OF LEFT FOREARM, INITIAL ENCOUNTER: Primary | ICD-10-CM
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
## 2022-08-23 DIAGNOSIS — I10 ESSENTIAL HYPERTENSION: ICD-10-CM

## 2022-08-23 PROCEDURE — G8427 DOCREV CUR MEDS BY ELIG CLIN: HCPCS | Performed by: FAMILY MEDICINE

## 2022-08-23 PROCEDURE — G8752 SYS BP LESS 140: HCPCS | Performed by: FAMILY MEDICINE

## 2022-08-23 PROCEDURE — G8754 DIAS BP LESS 90: HCPCS | Performed by: FAMILY MEDICINE

## 2022-08-23 PROCEDURE — 1123F ACP DISCUSS/DSCN MKR DOCD: CPT | Performed by: FAMILY MEDICINE

## 2022-08-23 PROCEDURE — 99214 OFFICE O/P EST MOD 30 MIN: CPT | Performed by: FAMILY MEDICINE

## 2022-08-23 PROCEDURE — G8417 CALC BMI ABV UP PARAM F/U: HCPCS | Performed by: FAMILY MEDICINE

## 2022-08-23 PROCEDURE — G8400 PT W/DXA NO RESULTS DOC: HCPCS | Performed by: FAMILY MEDICINE

## 2022-08-23 PROCEDURE — 3017F COLORECTAL CA SCREEN DOC REV: CPT | Performed by: FAMILY MEDICINE

## 2022-08-23 PROCEDURE — G8536 NO DOC ELDER MAL SCRN: HCPCS | Performed by: FAMILY MEDICINE

## 2022-08-23 PROCEDURE — 1101F PT FALLS ASSESS-DOCD LE1/YR: CPT | Performed by: FAMILY MEDICINE

## 2022-08-23 PROCEDURE — 1090F PRES/ABSN URINE INCON ASSESS: CPT | Performed by: FAMILY MEDICINE

## 2022-08-23 PROCEDURE — G8510 SCR DEP NEG, NO PLAN REQD: HCPCS | Performed by: FAMILY MEDICINE

## 2022-08-23 RX ORDER — ROSUVASTATIN CALCIUM 20 MG/1
TABLET, COATED ORAL
Qty: 90 TABLET | Refills: 0 | Status: CANCELLED | OUTPATIENT
Start: 2022-08-23

## 2022-08-23 RX ORDER — DOXYCYCLINE 100 MG/1
100 TABLET ORAL 2 TIMES DAILY
Qty: 28 TABLET | Refills: 0 | Status: SHIPPED | OUTPATIENT
Start: 2022-08-23 | End: 2022-09-06

## 2022-08-23 NOTE — PROGRESS NOTES
1. Have you been to the ER, urgent care clinic since your last visit? Hospitalized since your last visit? No    2. Have you seen or consulted any other health care providers outside of the [address] [place] [address] since your last visit? Include any pap smears or colon screening. No    3. For patients aged 39-70: Has the patient had a colonoscopy / FIT/ Cologuard? 2012     If the patient is female:    4. For patients aged 41-77: Has the patient had a mammogram within the past 2 years? 1/2020      5. For patients aged 21-65: Has the patient had a pap smear? NA - based on age or sex     Reviewed record in preparation for visit and have necessary documentation  Pt did not bring medication to office visit for review  Patient is accompanied by self I have received verbal consent from Zyante to discuss any/all medical information while they are present in the room.     Goals that were addressed and/or need to be completed during or after this appointment include     Health Maintenance Due   Topic Date Due    Pneumococcal 65+ years (2 - PCV) 07/19/2014    COVID-19 Vaccine (2 - Booster for Conner series) 04/28/2021    Bone Densitometry (Dexa) Screening  Never done    Breast Cancer Screen Mammogram  02/05/2022    Depression Screen  08/31/2022    A1C test (Diabetic or Prediabetic)  08/31/2022    Lipid Screen  08/31/2022    Medicare Yearly Exam  09/01/2022

## 2022-08-24 LAB
ALBUMIN SERPL-MCNC: 3.8 G/DL (ref 3.5–5)
ALBUMIN/GLOB SERPL: 1.1 {RATIO} (ref 1.1–2.2)
ALP SERPL-CCNC: 74 U/L (ref 45–117)
ALT SERPL-CCNC: 46 U/L (ref 12–78)
ANION GAP SERPL CALC-SCNC: 4 MMOL/L (ref 5–15)
AST SERPL-CCNC: 33 U/L (ref 15–37)
BILIRUB SERPL-MCNC: 0.5 MG/DL (ref 0.2–1)
BUN SERPL-MCNC: 13 MG/DL (ref 6–20)
BUN/CREAT SERPL: 19 (ref 12–20)
CALCIUM SERPL-MCNC: 10.2 MG/DL (ref 8.5–10.1)
CHLORIDE SERPL-SCNC: 104 MMOL/L (ref 97–108)
CHOLEST SERPL-MCNC: 163 MG/DL
CO2 SERPL-SCNC: 30 MMOL/L (ref 21–32)
CREAT SERPL-MCNC: 0.68 MG/DL (ref 0.55–1.02)
ERYTHROCYTE [DISTWIDTH] IN BLOOD BY AUTOMATED COUNT: 13.4 % (ref 11.5–14.5)
EST. AVERAGE GLUCOSE BLD GHB EST-MCNC: 131 MG/DL
GLOBULIN SER CALC-MCNC: 3.6 G/DL (ref 2–4)
GLUCOSE SERPL-MCNC: 137 MG/DL (ref 65–100)
HBA1C MFR BLD: 6.2 % (ref 4–5.6)
HCT VFR BLD AUTO: 44.1 % (ref 35–47)
HDLC SERPL-MCNC: 65 MG/DL
HDLC SERPL: 2.5 {RATIO} (ref 0–5)
HGB BLD-MCNC: 14.3 G/DL (ref 11.5–16)
LDLC SERPL CALC-MCNC: 76.8 MG/DL (ref 0–100)
MCH RBC QN AUTO: 34.2 PG (ref 26–34)
MCHC RBC AUTO-ENTMCNC: 32.4 G/DL (ref 30–36.5)
MCV RBC AUTO: 105.5 FL (ref 80–99)
NRBC # BLD: 0 K/UL (ref 0–0.01)
NRBC BLD-RTO: 0 PER 100 WBC
PLATELET # BLD AUTO: 247 K/UL (ref 150–400)
PMV BLD AUTO: 11 FL (ref 8.9–12.9)
POTASSIUM SERPL-SCNC: 4.8 MMOL/L (ref 3.5–5.1)
PROT SERPL-MCNC: 7.4 G/DL (ref 6.4–8.2)
RBC # BLD AUTO: 4.18 M/UL (ref 3.8–5.2)
SODIUM SERPL-SCNC: 138 MMOL/L (ref 136–145)
T4 FREE SERPL-MCNC: 0.9 NG/DL (ref 0.8–1.5)
TRIGL SERPL-MCNC: 106 MG/DL (ref ?–150)
TSH SERPL DL<=0.05 MIU/L-ACNC: 3.3 UIU/ML (ref 0.36–3.74)
VLDLC SERPL CALC-MCNC: 21.2 MG/DL
WBC # BLD AUTO: 5.5 K/UL (ref 3.6–11)

## 2022-08-29 NOTE — PROGRESS NOTES
Progress Note    Patient: Kylee Bob MRN: [id number]  SSN: [id number]    YOB: 1955  Age: 78 y.o. Sex: female        Chief Complaint   Patient presents with    Physical     Annual physical exam, labs, meds     she is a 78y.o. year old female who presents for follow up of chronic health conditions. Patient with hx of HTN, HLD, ERICKSON, obesity and tobacco abuse. She is due for lab work. . 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 Patient denies HA, dizziness, SOB, CP, abdominal pain, dysuria, acute myalgias or arthralgias. Encounter Diagnoses   Name Primary? Tick bite of left forearm, initial encounter Yes    Essential hypertension     Hypothyroidism, unspecified type     IFG (impaired fasting glucose)     Hypercholesterolemia        Patient Active Problem List   Diagnosis Code    Hypertension I10    Irritable bowel syndrome K58.9    Hypercholesterolemia E22.14    Dysmetabolic syndrome X X64.12    Elevated LFTs R79.89    Abnormal weight gain R63.5    Anxiety F41.9    Colon polyps K63.5    Hypothyroidism E03.9    ERICKSON (nonalcoholic steatohepatitis) K75.81    Smoker F17.200    Elevated blood sugar R73.9    Severe obesity (BMI 35.0-39. 9) E66.01    Scalp lesion L98.9    Lymphadenitis I88.9     Past Surgical History:   Procedure Laterality Date    HX CYST REMOVAL      neck    HX OTHER SURGICAL      DNC around 26years of age    [address] OTHER SURGICAL      wisdom teeth extraction     Social History     Socioeconomic History    Marital status:      Spouse name: Not on file    Number of children: Not on file    Years of education: Not on file    Highest education level: Not on file   Occupational History    Not on file   Tobacco Use    Smoking status: Every Day     Packs/day: 1.00     Years: 35.00     Pack years: 35.00     Types: Cigarettes    Smokeless tobacco: Never   Substance and Sexual Activity    Alcohol use: Yes     Comment: 2 boxes of wine each week    Drug use: No    Sexual activity: Yes     Partners: Male   Other Topics Concern    Not on file   Social History Narrative    Not on file     Social Determinants of Health     Financial Resource Strain: Low Risk     Difficulty of Paying Living Expenses: Not hard at all   Food Insecurity: No Food Insecurity    Worried About Running Out of Food in the Last Year: Never true    Ran Out of Food in the Last Year: Never true   Transportation Needs: Not on file   Physical Activity: Not on file   Stress: Not on file   Social Connections: Not on file   Intimate Partner Violence: Not on file   Housing Stability: Not on file     Family History   Problem Relation Age of Onset    COPD Mother     Hypertension Mother     Other Mother         congestive heart failure    Hypertension Father     Stroke Father     Heart Disease Father     Other Father         COPD     Current Outpatient Medications   Medication Sig    doxycycline (ADOXA) 100 mg tablet Take 1 Tablet by mouth two (2) times a day for 14 days. levothyroxine (SYNTHROID) 50 mcg tablet TAKE 1 TABLET DAILY BEFORE BREAKFAST    rosuvastatin (CRESTOR) 20 mg tablet TAKE 1 TABLET NIGHTLY FOR HIGH CHOLESTEROL    atenoloL (TENORMIN) 25 mg tablet TAKE 1 TABLET TWICE A DAY    lisinopriL (PRINIVIL, ZESTRIL) 40 mg tablet TAKE 1 TABLET DAILY    amLODIPine (NORVASC) 10 mg tablet TAKE 1 TABLET DAILY FOR HIGH BLOOD PRESSURE    PROAIR HFA 90 mcg/actuation inhaler USE 1 INHALATION EVERY 6 HOURS AS NEEDED FOR WHEEZING    vitamin E acetate (VITAMIN E PO) Take  by mouth. aspirin delayed-release 81 mg tablet Take  by mouth daily. PV W-O TESFAYE/FERROUS FUMARATE/FA (M-VIT PO) Take  by mouth.    loratadine (CLARITIN) 10 mg tablet Take 10 mg by mouth daily. cholecalciferol (VITAMIN D3) (1000 Units /25 mcg) tablet Take  by mouth daily. cyanocobalamin 1,000 mcg tablet Take 1,000 mcg by mouth daily. No current facility-administered medications for this visit.      Allergies   Allergen Reactions    Pravachol [Pravastatin] Myalgia    Zetia [Ezetimibe] Myalgia       Review of Systems:  Constitutional: Negative for fatigue, malaise  Resp: Negative for cough, wheezing or SOB  CV: Negative for chest pain, dizziness or palpitations  GI: Negative for nausea or abdominal pain  MS: Negative for acute myalgias or arthralgias   Neuro: Negative for HA, weakness or paresthesia  Psych: see PHI    Vitals:    08/23/22 0849   BP: 119/76   Pulse: (!) 55   Resp: 16   Temp: 98.2 °F (36.8 °C)   TempSrc: Oral   SpO2: 97%   Weight: 257 lb 9.6 oz (116.8 kg)   Height: 5' 6\" (1.676 m)       Physical Examination:  General: Well developed, well nourished, in no acute distress  Skin: seborrheic keratoses, sun damaged skin  Head: Normocephalic, atraumatic  Eyes: Sclera clear, EOMI  Neck: Normal range of motion  Respiratory: Symmetrical, unlabored effort  Cardiovascular: Regular rate and rhythm  Extremities: Full range of motion, normal gait  Neurologic: No focal deficits  Psych: Active, alert and oriented. Affect appropriate       ICD-10-CM ICD-9-CM    1. Tick bite of left forearm, initial encounter  S50.862A 913.4 doxycycline (ADOXA) 100 mg tablet    W57. XXXA E906.4       2. Essential hypertension  N69 146.6 METABOLIC PANEL, COMPREHENSIVE      CBC W/O DIFF      CBC W/O DIFF      METABOLIC PANEL, COMPREHENSIVE      3. Hypothyroidism, unspecified type  E03.9 244.9 TSH 3RD GENERATION      T4, FREE      T4, FREE      TSH 3RD GENERATION      4. IFG (impaired fasting glucose)  R73.01 790.21 HEMOGLOBIN A1C WITH EAG      HEMOGLOBIN A1C WITH EAG      5. Hypercholesterolemia  E78.00 272.0 LIPID PANEL      LIPID PANEL          Plan of care:  Diagnoses were discussed in detail with patient. Medications reviewed and appropriate. Patient to continue current prescribed medications as written. Medication risks/benefits/side effects discussed with patient. All of the patient's questions were addressed and answered to apparent satisfaction.    The patient understands and agrees with our plan of care. The patient knows to call back if they have questions about the plan of care or if symptoms change. The patient received an After-Visit Summary which contains VS, diagnoses, orders, allergy and medication lists. Future Appointments   Date Time Provider Vel Orr   2/22/2023  8:40 AM Shmuel Joyner MD [facility]       Follow-up and Dispositions    Return in about 6 months (around 2/23/2023), or if symptoms worsen or fail to improve.

## 2022-09-14 RX ORDER — LEVOTHYROXINE SODIUM 50 UG/1
50 TABLET ORAL
Qty: 90 TABLET | Refills: 1 | Status: SHIPPED | OUTPATIENT
Start: 2022-09-14

## 2022-09-14 RX ORDER — ROSUVASTATIN CALCIUM 20 MG/1
TABLET, COATED ORAL
Qty: 90 TABLET | Refills: 1 | Status: SHIPPED | OUTPATIENT
Start: 2022-09-14

## 2023-02-22 ENCOUNTER — OFFICE VISIT (OUTPATIENT)
Dept: FAMILY MEDICINE CLINIC | Age: 68
End: 2023-02-22
Payer: MEDICARE

## 2023-02-22 VITALS
RESPIRATION RATE: 20 BRPM | WEIGHT: 250 LBS | BODY MASS INDEX: 40.18 KG/M2 | HEART RATE: 55 BPM | OXYGEN SATURATION: 96 % | SYSTOLIC BLOOD PRESSURE: 125 MMHG | HEIGHT: 66 IN | TEMPERATURE: 97.8 F | DIASTOLIC BLOOD PRESSURE: 81 MMHG

## 2023-02-22 DIAGNOSIS — E66.01 OBESITY, CLASS III, BMI 40-49.9 (MORBID OBESITY) (HCC): ICD-10-CM

## 2023-02-22 DIAGNOSIS — E78.00 HYPERCHOLESTEROLEMIA: ICD-10-CM

## 2023-02-22 DIAGNOSIS — E03.9 HYPOTHYROIDISM, UNSPECIFIED TYPE: ICD-10-CM

## 2023-02-22 DIAGNOSIS — R73.01 IFG (IMPAIRED FASTING GLUCOSE): ICD-10-CM

## 2023-02-22 DIAGNOSIS — I10 PRIMARY HYPERTENSION: Primary | ICD-10-CM

## 2023-02-22 DIAGNOSIS — Z00.00 MEDICARE ANNUAL WELLNESS VISIT, SUBSEQUENT: ICD-10-CM

## 2023-02-22 DIAGNOSIS — Z12.11 SCREEN FOR COLON CANCER: ICD-10-CM

## 2023-02-22 DIAGNOSIS — Z12.31 BREAST CANCER SCREENING BY MAMMOGRAM: ICD-10-CM

## 2023-02-22 NOTE — PROGRESS NOTES
1. Have you been to the ER, urgent care clinic since your last visit? Hospitalized since your last visit? No    2. Have you seen or consulted any other health care providers outside of the [address] [place] [address] since your last visit? Include any pap smears or colon screening. No  Reviewed record in preparation for visit and have necessary documentation  Pt did not bring medication to office visit for review  opportunity was given for questions      3. For patients aged 39-70: Has the patient had a colonoscopy / FIT/ Cologuard? No      If the patient is female:    4. For patients aged 41-77: Has the patient had a mammogram within the past 2 years? No      5. For patients aged 21-65: Has the patient had a pap smear?  NA - based on age or sex      Goals that were addressed and/or need to be completed during or after this appointment include   Health Maintenance Due   Topic Date Due    Shingles Vaccine (1 of 2) Never done    COVID-19 Vaccine (2 - Booster for Conner series) 04/28/2021    Bone Densitometry (Dexa) Screening  Never done    Breast Cancer Screen Mammogram  02/05/2022    Flu Vaccine (1) 08/01/2022    Medicare Yearly Exam  09/01/2022    Colorectal Cancer Screening Combo  11/19/2022

## 2023-02-23 LAB
ALBUMIN SERPL-MCNC: 3.6 G/DL (ref 3.5–5)
ALBUMIN/GLOB SERPL: 0.9 (ref 1.1–2.2)
ALP SERPL-CCNC: 76 U/L (ref 45–117)
ALT SERPL-CCNC: 37 U/L (ref 12–78)
ANION GAP SERPL CALC-SCNC: 4 MMOL/L (ref 5–15)
AST SERPL-CCNC: 28 U/L (ref 15–37)
BILIRUB SERPL-MCNC: 0.4 MG/DL (ref 0.2–1)
BUN SERPL-MCNC: 10 MG/DL (ref 6–20)
BUN/CREAT SERPL: 16 (ref 12–20)
CALCIUM SERPL-MCNC: 9.6 MG/DL (ref 8.5–10.1)
CHLORIDE SERPL-SCNC: 108 MMOL/L (ref 97–108)
CHOLEST SERPL-MCNC: 147 MG/DL
CO2 SERPL-SCNC: 28 MMOL/L (ref 21–32)
CREAT SERPL-MCNC: 0.62 MG/DL (ref 0.55–1.02)
ERYTHROCYTE [DISTWIDTH] IN BLOOD BY AUTOMATED COUNT: 13.3 % (ref 11.5–14.5)
EST. AVERAGE GLUCOSE BLD GHB EST-MCNC: 123 MG/DL
GLOBULIN SER CALC-MCNC: 3.8 G/DL (ref 2–4)
GLUCOSE SERPL-MCNC: 128 MG/DL (ref 65–100)
HBA1C MFR BLD: 5.9 % (ref 4–5.6)
HCT VFR BLD AUTO: 44.4 % (ref 35–47)
HDLC SERPL-MCNC: 63 MG/DL
HDLC SERPL: 2.3 (ref 0–5)
HGB BLD-MCNC: 14.2 G/DL (ref 11.5–16)
LDLC SERPL CALC-MCNC: 66.4 MG/DL (ref 0–100)
MCH RBC QN AUTO: 32.3 PG (ref 26–34)
MCHC RBC AUTO-ENTMCNC: 32 G/DL (ref 30–36.5)
MCV RBC AUTO: 100.9 FL (ref 80–99)
NRBC # BLD: 0 K/UL (ref 0–0.01)
NRBC BLD-RTO: 0 PER 100 WBC
PLATELET # BLD AUTO: 305 K/UL (ref 150–400)
PMV BLD AUTO: 11.1 FL (ref 8.9–12.9)
POTASSIUM SERPL-SCNC: 4.7 MMOL/L (ref 3.5–5.1)
PROT SERPL-MCNC: 7.4 G/DL (ref 6.4–8.2)
RBC # BLD AUTO: 4.4 M/UL (ref 3.8–5.2)
SODIUM SERPL-SCNC: 140 MMOL/L (ref 136–145)
TRIGL SERPL-MCNC: 88 MG/DL (ref ?–150)
TSH SERPL DL<=0.05 MIU/L-ACNC: 2.99 UIU/ML (ref 0.36–3.74)
VLDLC SERPL CALC-MCNC: 17.6 MG/DL
WBC # BLD AUTO: 5 K/UL (ref 3.6–11)

## 2023-02-27 NOTE — PATIENT INSTRUCTIONS
Medicare Wellness Visit, Female     The best way to live healthy is to have a lifestyle where you eat a well-balanced diet, exercise regularly, limit alcohol use, and quit all forms of tobacco/nicotine, if applicable. Regular preventive services are another way to keep healthy. Preventive services (vaccines, screening tests, monitoring & exams) can help personalize your care plan, which helps you manage your own care. Screening tests can find health problems at the earliest stages, when they are easiest to treat. [facility] follows the current, evidence-based guidelines published by the [facility] ([facility]) when recommending preventive services for our patients. Because we follow these guidelines, sometimes recommendations change over time as research supports it. (For example, mammograms used to be recommended annually. Even though Medicare will still pay for an annual mammogram, the newer guidelines recommend a mammogram every two years for women of average risk). Of course, you and your doctor may decide to screen more often for some diseases, based on your risk and your co-morbidities (chronic disease you are already diagnosed with). Preventive services for you include:  - Medicare offers their members a free annual wellness visit, which is time for you and your primary care provider to discuss and plan for your preventive service needs.  Take advantage of this benefit every year!    -Over the age of 72 should receive the recommended pneumonia vaccines.    -All adults should have a flu vaccine yearly.  -All adults should have a tetanus vaccine every 10 years.   -Over the age 48 should receive the shingles vaccines.        -All adults should be screened once for Hepatitis C.  -All adults age 38-68 who are overweight should have a diabetes screening test once every three years.   -Other screening tests and preventive services for persons with diabetes include: an eye exam to screen for diabetic retinopathy, a kidney function test, a foot exam, and stricter control over your cholesterol.   -Cardiovascular screening for adults with routine risk involves an electrocardiogram (ECG) at intervals determined by your doctor.     -Colorectal cancer screenings should be done for adults age 39-70 with no increased risk factors for colorectal cancer. There are a number of acceptable methods of screening for this type of cancer. Each test has its own benefits and drawbacks. Discuss with your doctor what is most appropriate for you during your annual wellness visit. The different tests include: colonoscopy (considered the best screening method), a fecal occult blood test, a fecal DNA test, and sigmoidoscopy.    -Lung cancer screening is recommended annually with a low dose CT scan for adults between age 54 and 68, who have smoked at least 30 pack years (equivalent of 1 pack per day for 30 days), and who is a current smoker or quit less than 15 years ago.    -A bone mass density test is recommended when a woman turns 65 to screen for osteoporosis. This test is only recommended one time, as a screening. Some providers will use this same test as a disease monitoring tool if you already have osteoporosis. -Breast cancer screenings are recommended every other year for women of normal risk, age 54-69.    -Cervical cancer screenings for women over age 72 are only recommended with certain risk factors.      Here is a list of your current Health Maintenance items (your personalized list of preventive services) with a due date:  Health Maintenance Due   Topic Date Due    Shingles Vaccine (1 of 2) Never done    COVID-19 Vaccine (2 - Booster for Lovejuice series) 04/28/2021    Bone Mineral Density   Never done    Mammogram  02/05/2022    Yearly Flu Vaccine (1) 08/01/2022    Colorectal Screening  11/19/2022

## 2023-02-27 NOTE — PROGRESS NOTES
Progress Note    Patient: Alexandro Andre MRN: [id number]  SSN: [id number]    YOB: 1955  Age: 78 y.o. Sex: female        Chief Complaint   Patient presents with    Follow Up Chronic Condition     she is a 78y.o. year old female who presents for follow up of chronic health conditions. Patient with hx of HTN, HLD, ERICKSON, obesity and tobacco abuse. She is due for lab work. . Patient denies HA, dizziness, SOB, CP, abdominal pain, dysuria, acute myalgias or arthralgias. She continues to smoke. Encounter Diagnoses   Name Primary? Primary hypertension Yes    Hypercholesterolemia     Hypothyroidism, unspecified type     IFG (impaired fasting glucose)     Obesity, Class III, BMI 40-49.9 (morbid obesity) ([facility]     Breast cancer screening by mammogram     Screen for colon cancer      BP Readings from Last 3 Encounters:   02/22/23 125/81   08/23/22 119/76   08/31/21 120/83     Wt Readings from Last 3 Encounters:   02/22/23 250 lb (113.4 kg)   08/23/22 257 lb 9.6 oz (116.8 kg)   08/31/21 258 lb (117 kg)     Body mass index is 40.35 kg/m².   Lab Results   Component Value Date/Time    WBC 5.0 02/22/2023 09:55 AM    HGB 14.2 02/22/2023 09:55 AM    HCT 44.4 02/22/2023 09:55 AM    PLATELET 998 01/03/8936 09:55 AM    .9 (H) 02/22/2023 09:55 AM     Lab Results   Component Value Date/Time    Cholesterol, total 147 02/22/2023 09:55 AM    HDL Cholesterol 63 02/22/2023 09:55 AM    LDL, calculated 66.4 02/22/2023 09:55 AM    Triglyceride 88 02/22/2023 09:55 AM    CHOL/HDL Ratio 2.3 02/22/2023 09:55 AM     Lab Results   Component Value Date/Time    TSH 2.99 02/22/2023 09:55 AM    T4, Free 0.9 08/23/2022 10:15 AM      Lab Results   Component Value Date/Time    Sodium 140 02/22/2023 09:55 AM    Potassium 4.7 02/22/2023 09:55 AM    Chloride 108 02/22/2023 09:55 AM    CO2 28 02/22/2023 09:55 AM    Anion gap 4 (L) 02/22/2023 09:55 AM    Glucose 128 (H) 02/22/2023 09:55 AM    BUN 10 02/22/2023 09:55 AM    Creatinine 0.62 02/22/2023 09:55 AM    BUN/Creatinine ratio 16 02/22/2023 09:55 AM    GFR est AA >60 08/23/2022 10:15 AM    GFR est non-AA >60 08/23/2022 10:15 AM    Calcium 9.6 02/22/2023 09:55 AM    Bilirubin, total 0.4 02/22/2023 09:55 AM    ALT (SGPT) 37 02/22/2023 09:55 AM    Alk. phosphatase 76 02/22/2023 09:55 AM    Protein, total 7.4 02/22/2023 09:55 AM    Albumin 3.6 02/22/2023 09:55 AM    Globulin 3.8 02/22/2023 09:55 AM    A-G Ratio 0.9 (L) 02/22/2023 09:55 AM        Patient Active Problem List   Diagnosis Code    Hypertension I10    Irritable bowel syndrome K58.9    Hypercholesterolemia Q05.02    Dysmetabolic syndrome X Y21.08    Elevated LFTs R79.89    Abnormal weight gain R63.5    Anxiety F41.9    Colon polyps K63.5    Hypothyroidism E03.9    ERICKSON (nonalcoholic steatohepatitis) K75.81    Smoker F17.200    Elevated blood sugar R73.9    Severe obesity (BMI 35.0-39. 9) E66.01    Scalp lesion L98.9    Lymphadenitis I88.9     Past Surgical History:   Procedure Laterality Date    HX CYST REMOVAL      neck    HX OTHER SURGICAL      DNC around 26years of age    [de-identified] OTHER SURGICAL      wisdom teeth extraction     Social History     Socioeconomic History    Marital status:      Spouse name: Not on file    Number of children: Not on file    Years of education: Not on file    Highest education level: Not on file   Occupational History    Not on file   Tobacco Use    Smoking status: Every Day     Packs/day: 1.00     Years: 35.00     Pack years: 35.00     Types: Cigarettes    Smokeless tobacco: Never   Substance and Sexual Activity    Alcohol use: Yes     Comment: 2 boxes of wine each week    Drug use: No    Sexual activity: Yes     Partners: Male   Other Topics Concern    Not on file   Social History Narrative    Not on file     Social Determinants of Health     Financial Resource Strain: Low Risk     Difficulty of Paying Living Expenses: Not hard at all   Food Insecurity: No Food Insecurity    Worried About Running Out of Food in the Last Year: Never true    Ran Out of Food in the Last Year: Never true   Transportation Needs: Not on file   Physical Activity: Not on file   Stress: Not on file   Social Connections: Not on file   Intimate Partner Violence: Not on file   Housing Stability: Not on file     Family History   Problem Relation Age of Onset    COPD Mother     Hypertension Mother     Other Mother         congestive heart failure    Hypertension Father     Stroke Father     Heart Disease Father     Other Father         COPD     Current Outpatient Medications   Medication Sig    amLODIPine (NORVASC) 10 mg tablet TAKE 1 TABLET DAILY FOR HIGH BLOOD PRESSURE    lisinopriL (PRINIVIL, ZESTRIL) 40 mg tablet TAKE 1 TABLET DAILY    atenoloL (TENORMIN) 25 mg tablet TAKE 1 TABLET TWICE A DAY    levothyroxine (SYNTHROID) 50 mcg tablet Take 1 Tablet by mouth Daily (before breakfast). PROAIR HFA 90 mcg/actuation inhaler USE 1 INHALATION EVERY 6 HOURS AS NEEDED FOR WHEEZING    vitamin E acetate (VITAMIN E PO) Take  by mouth. aspirin delayed-release 81 mg tablet Take  by mouth daily. PV W-O TESFAYE/FERROUS FUMARATE/FA (M-VIT PO) Take  by mouth.    loratadine (CLARITIN) 10 mg tablet Take 10 mg by mouth daily. cholecalciferol (VITAMIN D3) (1000 Units /25 mcg) tablet Take  by mouth daily. cyanocobalamin 1,000 mcg tablet Take 1,000 mcg by mouth daily. rosuvastatin (CRESTOR) 20 mg tablet TAKE 1 TABLET NIGHTLY FOR HIGH CHOLESTEROL     No current facility-administered medications for this visit.      Allergies   Allergen Reactions    Pravachol [Pravastatin] Myalgia    Zetia [Ezetimibe] Myalgia       Review of Systems:  Constitutional: Negative for fatigue, malaise  Resp: Negative for cough, wheezing or SOB  CV: Negative for chest pain, dizziness or palpitations  GI: Negative for nausea or abdominal pain  MS: Negative for acute myalgias or arthralgias   Neuro: Negative for HA, weakness or paresthesia  Psych: see PHI    Vitals:    02/22/23 0856   BP: 125/81   Pulse: (!) 55   Resp: 20   Temp: 97.8 °F (36.6 °C)   SpO2: 96%   Weight: 250 lb (113.4 kg)   Height: 5' 6\" (1.676 m)       Physical Examination:  General: Well developed, well nourished, in no acute distress  Skin: warm and dry  Head: Normocephalic, atraumatic  Eyes: Sclera clear, EOMI  Neck: Normal range of motion  Respiratory: Symmetrical, unlabored effort  Cardiovascular: Regular rate and rhythm  Extremities: Full range of motion, normal gait  Neurologic: No focal deficits  Psych: Active, alert and oriented. Affect appropriate       ICD-10-CM ICD-9-CM    1. Primary hypertension  G35 320.9 METABOLIC PANEL, COMPREHENSIVE      LIPID PANEL      CBC W/O DIFF      CBC W/O DIFF      LIPID PANEL      METABOLIC PANEL, COMPREHENSIVE      2. Hypercholesterolemia  I85.89 151.8 METABOLIC PANEL, COMPREHENSIVE      LIPID PANEL      LIPID PANEL      METABOLIC PANEL, COMPREHENSIVE      3. Hypothyroidism, unspecified type  E03.9 244.9 TSH 3RD GENERATION      TSH 3RD GENERATION      4. IFG (impaired fasting glucose)  R73.01 790.21 HEMOGLOBIN A1C WITH EAG      HEMOGLOBIN A1C WITH EAG      5. Obesity, Class III, BMI 40-49.9 (morbid obesity) (HCC)  E66.01 278.01       6. Breast cancer screening by mammogram  Z12.31 V76.12 CHAMP MAMMO BI SCREENING INCL CAD      7. Screen for colon cancer  Z12.11 V76.51 OCCULT BLOOD IMMUNOASSAY,DIAGNOSTIC      OCCULT BLOOD IMMUNOASSAY,DIAGNOSTIC          Plan of care:  Diagnoses were discussed in detail with patient. Medications reviewed and appropriate. Patient to continue current prescribed medications as written. Medication risks/benefits/side effects discussed with patient. All of the patient's questions were addressed and answered to apparent satisfaction. The patient understands and agrees with our plan of care. The patient knows to call back if they have questions about the plan of care or if symptoms change.   The patient received an After-Visit Summary which contains VS, diagnoses, orders, allergy and medication lists. Future Appointments   Date Time Provider Vel Wilhelmi   8/22/2023  8:40 AM Abdirizak Lund MD [facility] BS [facility]           Follow-up and Dispositions    Return in about 6 months (around 8/22/2023). The following Annual Medicare Wellness Exam is distinct and separate from the medical evaluation and decision making. This is the Subsequent Medicare Annual Wellness Exam, performed 12 months or more after the Initial AWV or the last Subsequent AWV    I have reviewed the patient's medical history in detail and updated the computerized patient record. Assessment/Plan   Education and counseling provided:  Are appropriate based on today's review and evaluation  End-of-Life planning (with patient's consent)    1.  Subsequent Medicare Annual Wellness Exam       Depression Risk Factor Screening     3 most recent PHQ Screens 8/23/2022   Little interest or pleasure in doing things Several days   Feeling down, depressed, irritable, or hopeless Not at all   Total Score PHQ 2 1   Trouble falling or staying asleep, or sleeping too much More than half the days   Feeling tired or having little energy More than half the days   Poor appetite, weight loss, or overeating More than half the days   Feeling bad about yourself - or that you are a failure or have let yourself or your family down Not at all   Trouble concentrating on things such as school, work, reading, or watching TV Not at all   Moving or speaking so slowly that other people could have noticed; or the opposite being so fidgety that others notice Not at all   Thoughts of being better off dead, or hurting yourself in some way Not at all   PHQ 9 Score 7   How difficult have these problems made it for you to do your work, take care of your home and get along with others Not difficult at all       Alcohol & Drug Abuse Risk Screen    Do you average more than 1 drink per night or more than 7 drinks a week:  No    On any one occasion in the past three months have you have had more than 3 drinks containing alcohol:  No          Functional Ability and Level of Safety    Hearing: Hearing is good. Activities of Daily Living: The home contains: no safety equipment. Patient does total self care      Ambulation: with no difficulty     Fall Risk:  Fall Risk Assessment, last 12 mths 2/22/2023   Able to walk? Yes   Fall in past 12 months? 0   Do you feel unsteady? 0   Are you worried about falling 0      Abuse Screen:  Patient is not abused       Cognitive Screening    Has your family/caregiver stated any concerns about your memory: no     Screening deferred. Health Maintenance Due     Health Maintenance Due   Topic Date Due    Shingles Vaccine (1 of 2) Never done    COVID-19 Vaccine (2 - Booster for Conner series) 04/28/2021    Bone Densitometry (Dexa) Screening  Never done    Breast Cancer Screen Mammogram  02/05/2022    Flu Vaccine (1) 08/01/2022    Medicare Yearly Exam  09/01/2022    Colorectal Cancer Screening Combo  11/19/2022       Patient Care Team   Patient Care Team:  Neil Sparrow MD as PCP - General (Family Medicine)  Neil Sparrow [facility]  Jerome Harris MD as Physician (Gastroenterology)  Burke Lyon MD (General Surgery)    History     Patient Active Problem List   Diagnosis Code    Hypertension I10    Irritable bowel syndrome K58.9    Hypercholesterolemia G50.07    Dysmetabolic syndrome X U17.21    Elevated LFTs R79.89    Abnormal weight gain R63.5    Anxiety F41.9    Colon polyps K63.5    Hypothyroidism E03.9    ERICKSON (nonalcoholic steatohepatitis) K75.81    Smoker F17.200    Elevated blood sugar R73.9    Severe obesity (BMI 35.0-39. 9) E66.01    Scalp lesion L98.9    Lymphadenitis I88.9     Past Medical History:   Diagnosis Date    Abnormal LFT's     Abnormal weight gain     Anxiety     Depression     Dysmetabolic syndrome X Hypercholesterolemia     Hypertension     Irritable bowel syndrome     Thyroid disease       Past Surgical History:   Procedure Laterality Date    HX CYST REMOVAL      neck    HX OTHER SURGICAL      DNC around 26years of age    [de-identified] OTHER SURGICAL      wisdom teeth extraction     Current Outpatient Medications   Medication Sig Dispense Refill    amLODIPine (NORVASC) 10 mg tablet TAKE 1 TABLET DAILY FOR HIGH BLOOD PRESSURE 90 Tablet 1    lisinopriL (PRINIVIL, ZESTRIL) 40 mg tablet TAKE 1 TABLET DAILY 90 Tablet 1    atenoloL (TENORMIN) 25 mg tablet TAKE 1 TABLET TWICE A  Tablet 1    levothyroxine (SYNTHROID) 50 mcg tablet Take 1 Tablet by mouth Daily (before breakfast). 90 Tablet 1    PROAIR HFA 90 mcg/actuation inhaler USE 1 INHALATION EVERY 6 HOURS AS NEEDED FOR WHEEZING 8.5 g 7    vitamin E acetate (VITAMIN E PO) Take  by mouth. aspirin delayed-release 81 mg tablet Take  by mouth daily. PV W-O TESFAYE/FERROUS FUMARATE/FA (M-VIT PO) Take  by mouth.      loratadine (CLARITIN) 10 mg tablet Take 10 mg by mouth daily. cholecalciferol (VITAMIN D3) (1000 Units /25 mcg) tablet Take  by mouth daily. cyanocobalamin 1,000 mcg tablet Take 1,000 mcg by mouth daily.       rosuvastatin (CRESTOR) 20 mg tablet TAKE 1 TABLET NIGHTLY FOR HIGH CHOLESTEROL 90 Tablet 1     Allergies   Allergen Reactions    Pravachol [Pravastatin] Myalgia    Zetia [Ezetimibe] Myalgia       Family History   Problem Relation Age of Onset    COPD Mother     Hypertension Mother     Other Mother         congestive heart failure    Hypertension Father     Stroke Father     Heart Disease Father     Other Father         COPD     Social History     Tobacco Use    Smoking status: Every Day     Packs/day: 1.00     Years: 35.00     Pack years: 35.00     Types: Cigarettes    Smokeless tobacco: Never   Substance Use Topics    Alcohol use: Yes     Comment: 2 boxes of wine each week         aYsh Sahu MD

## 2023-05-19 RX ORDER — LISINOPRIL 40 MG/1
TABLET ORAL
Qty: 90 TABLET | Refills: 1 | Status: SHIPPED | OUTPATIENT
Start: 2023-05-19

## 2023-05-19 RX ORDER — ATENOLOL 25 MG/1
TABLET ORAL
Qty: 180 TABLET | Refills: 1 | Status: SHIPPED | OUTPATIENT
Start: 2023-05-19

## 2023-05-19 RX ORDER — AMLODIPINE BESYLATE 10 MG/1
TABLET ORAL
Qty: 90 TABLET | Refills: 1 | Status: SHIPPED | OUTPATIENT
Start: 2023-05-19

## 2023-08-22 RX ORDER — ROSUVASTATIN CALCIUM 20 MG/1
TABLET, COATED ORAL
Qty: 90 TABLET | Refills: 1 | Status: SHIPPED | OUTPATIENT
Start: 2023-08-22

## 2023-09-01 ENCOUNTER — OFFICE VISIT (OUTPATIENT)
Facility: CLINIC | Age: 68
End: 2023-09-01

## 2023-09-01 VITALS
HEART RATE: 58 BPM | DIASTOLIC BLOOD PRESSURE: 72 MMHG | WEIGHT: 248.6 LBS | OXYGEN SATURATION: 96 % | TEMPERATURE: 97.7 F | BODY MASS INDEX: 39.95 KG/M2 | RESPIRATION RATE: 14 BRPM | SYSTOLIC BLOOD PRESSURE: 111 MMHG | HEIGHT: 66 IN

## 2023-09-01 DIAGNOSIS — E78.00 PURE HYPERCHOLESTEROLEMIA, UNSPECIFIED: ICD-10-CM

## 2023-09-01 DIAGNOSIS — E03.9 HYPOTHYROIDISM, UNSPECIFIED TYPE: ICD-10-CM

## 2023-09-01 DIAGNOSIS — R73.01 IMPAIRED FASTING GLUCOSE: ICD-10-CM

## 2023-09-01 DIAGNOSIS — I10 ESSENTIAL (PRIMARY) HYPERTENSION: Primary | ICD-10-CM

## 2023-09-01 LAB — HBA1C MFR BLD: 6.2 %

## 2023-09-01 SDOH — ECONOMIC STABILITY: FOOD INSECURITY: WITHIN THE PAST 12 MONTHS, YOU WORRIED THAT YOUR FOOD WOULD RUN OUT BEFORE YOU GOT MONEY TO BUY MORE.: NEVER TRUE

## 2023-09-01 SDOH — ECONOMIC STABILITY: INCOME INSECURITY: HOW HARD IS IT FOR YOU TO PAY FOR THE VERY BASICS LIKE FOOD, HOUSING, MEDICAL CARE, AND HEATING?: NOT HARD AT ALL

## 2023-09-01 SDOH — ECONOMIC STABILITY: HOUSING INSECURITY
IN THE LAST 12 MONTHS, WAS THERE A TIME WHEN YOU DID NOT HAVE A STEADY PLACE TO SLEEP OR SLEPT IN A SHELTER (INCLUDING NOW)?: NO

## 2023-09-01 SDOH — ECONOMIC STABILITY: FOOD INSECURITY: WITHIN THE PAST 12 MONTHS, THE FOOD YOU BOUGHT JUST DIDN'T LAST AND YOU DIDN'T HAVE MONEY TO GET MORE.: NEVER TRUE

## 2023-09-01 ASSESSMENT — PATIENT HEALTH QUESTIONNAIRE - PHQ9
1. LITTLE INTEREST OR PLEASURE IN DOING THINGS: 0
SUM OF ALL RESPONSES TO PHQ QUESTIONS 1-9: 0
SUM OF ALL RESPONSES TO PHQ9 QUESTIONS 1 & 2: 0
SUM OF ALL RESPONSES TO PHQ QUESTIONS 1-9: 0
SUM OF ALL RESPONSES TO PHQ QUESTIONS 1-9: 0
2. FEELING DOWN, DEPRESSED OR HOPELESS: 0
SUM OF ALL RESPONSES TO PHQ QUESTIONS 1-9: 0

## 2023-09-05 ENCOUNTER — NURSE ONLY (OUTPATIENT)
Facility: CLINIC | Age: 68
End: 2023-09-05

## 2023-09-05 DIAGNOSIS — I10 ESSENTIAL (PRIMARY) HYPERTENSION: ICD-10-CM

## 2023-09-05 DIAGNOSIS — E78.00 PURE HYPERCHOLESTEROLEMIA, UNSPECIFIED: ICD-10-CM

## 2023-09-05 DIAGNOSIS — E03.9 HYPOTHYROIDISM, UNSPECIFIED TYPE: ICD-10-CM

## 2023-09-06 LAB
ALBUMIN SERPL-MCNC: 3.5 G/DL (ref 3.5–5)
ALBUMIN/GLOB SERPL: 0.9 (ref 1.1–2.2)
ALP SERPL-CCNC: 80 U/L (ref 45–117)
ALT SERPL-CCNC: 36 U/L (ref 12–78)
ANION GAP SERPL CALC-SCNC: 5 MMOL/L (ref 5–15)
AST SERPL-CCNC: 24 U/L (ref 15–37)
BILIRUB SERPL-MCNC: 0.5 MG/DL (ref 0.2–1)
BUN SERPL-MCNC: 11 MG/DL (ref 6–20)
BUN/CREAT SERPL: 19 (ref 12–20)
CALCIUM SERPL-MCNC: 8.9 MG/DL (ref 8.5–10.1)
CHLORIDE SERPL-SCNC: 106 MMOL/L (ref 97–108)
CHOLEST SERPL-MCNC: 146 MG/DL
CO2 SERPL-SCNC: 28 MMOL/L (ref 21–32)
CREAT SERPL-MCNC: 0.59 MG/DL (ref 0.55–1.02)
GLOBULIN SER CALC-MCNC: 3.7 G/DL (ref 2–4)
GLUCOSE SERPL-MCNC: 124 MG/DL (ref 65–100)
HCT VFR BLD AUTO: 44.2 % (ref 35–47)
HDLC SERPL-MCNC: 59 MG/DL
HDLC SERPL: 2.5 (ref 0–5)
HGB BLD-MCNC: 14.4 G/DL (ref 11.5–16)
LDLC SERPL CALC-MCNC: 65.4 MG/DL (ref 0–100)
POTASSIUM SERPL-SCNC: 4.4 MMOL/L (ref 3.5–5.1)
PROT SERPL-MCNC: 7.2 G/DL (ref 6.4–8.2)
SODIUM SERPL-SCNC: 139 MMOL/L (ref 136–145)
TRIGL SERPL-MCNC: 108 MG/DL
TSH SERPL DL<=0.05 MIU/L-ACNC: 3.09 UIU/ML (ref 0.36–3.74)
VLDLC SERPL CALC-MCNC: 21.6 MG/DL

## 2023-09-08 NOTE — PROGRESS NOTES
1. \"Have you been to the ER, urgent care clinic since your last visit? Hospitalized since your last visit? \" NO    2. \"Have you seen or consulted any other health care providers outside of the [address] [place] [address] since your last visit? \" Yes, [facility]     3. For patients aged 43-73: Has the patient had a colonoscopy / FIT/ Cologuard? NO over 10 years ago       If the patient is female:    4. For patients aged 43-66: Has the patient had a mammogram within the past 2 years? NO 2020      5. For patients aged 21-65: Has the patient had a pap smear?  N/A    Health Maintenance Due   Topic Date Due    Shingles vaccine (1 of 2) Never done    DEXA (modify frequency per FRAX score)  Never done    COVID-19 Vaccine (2 - Booster for Elizabeth series) 04/28/2021    Breast cancer screen  02/05/2022    Colorectal Cancer Screen  11/19/2022    Flu vaccine (1) 08/01/2023    Depression Screen  08/23/2023
symptoms change. The patient received an After-Visit Summary which contains VS, diagnoses, orders, allergy and medication lists. Future Appointments   Date Time Provider 4600  [facility]   3/1/2024  9:00 AM Brandon Brambila MD [facility] BS [facility]           Follow-up and Dispositions    Return in about 6 months (around 3/1/2024).

## 2023-09-11 RX ORDER — LEVOTHYROXINE SODIUM 0.05 MG/1
TABLET ORAL
Qty: 90 TABLET | Refills: 1 | Status: SHIPPED | OUTPATIENT
Start: 2023-09-11

## 2023-11-13 RX ORDER — LISINOPRIL 40 MG/1
TABLET ORAL
Qty: 90 TABLET | Refills: 1 | Status: SHIPPED | OUTPATIENT
Start: 2023-11-13

## 2023-11-13 RX ORDER — AMLODIPINE BESYLATE 10 MG/1
TABLET ORAL
Qty: 90 TABLET | Refills: 1 | Status: SHIPPED | OUTPATIENT
Start: 2023-11-13

## 2023-11-13 RX ORDER — ATENOLOL 25 MG/1
TABLET ORAL
Qty: 180 TABLET | Refills: 1 | Status: SHIPPED | OUTPATIENT
Start: 2023-11-13

## 2024-01-24 ENCOUNTER — TELEPHONE (OUTPATIENT)
Facility: CLINIC | Age: 69
End: 2024-01-24

## 2024-01-24 NOTE — TELEPHONE ENCOUNTER
Tried to contact patient to schedule mammogram ordered by Chin Carlson MD on 2/22/23. No answer, VM full, [employer] message sent.

## 2024-02-09 NOTE — PROGRESS NOTES
Chief Complaint   Patient presents with    Cough     Dry cough for several months     Body mass index is 39.32 kg/(m^2). 1. Have you been to the ER, urgent care clinic since your last visit? Hospitalized since your last visit? No    2. Have you seen or consulted any other health care providers outside of the [address] [place] [address] since your last visit? Include any pap smears or colon screening.  No    Reviewed record in preparation for visit and have necessary documentation  Pt did not bring medication to office visit for review  Information was given to pt on Advanced Directives, Living Will  Information was given on Shingles Vaccine  Opportunity was given for questions  Goals that were addressed and/or need to be completed after this appointment include:   Health Maintenance Due   Topic Date Due    Shingrix Vaccine Age 50> (1 of 2) 07/13/2006    PAP AKA CERVICAL CYTOLOGY  08/02/2016    BREAST CANCER SCRN MAMMOGRAM  08/26/2016    Influenza Age 9 to Adult  08/01/2018 [address]st floor apt, 2 [address]IA[address] w/ rail which pt has been able to negotiate since sx. has RW. has been using umbrella as a cane

## 2024-02-19 RX ORDER — ROSUVASTATIN CALCIUM 20 MG/1
TABLET, COATED ORAL
Qty: 90 TABLET | Refills: 1 | Status: SHIPPED | OUTPATIENT
Start: 2024-02-19

## 2024-03-01 ENCOUNTER — OFFICE VISIT (OUTPATIENT)
Facility: CLINIC | Age: 69
End: 2024-03-01

## 2024-03-01 VITALS
SYSTOLIC BLOOD PRESSURE: 115 MMHG | RESPIRATION RATE: 14 BRPM | HEART RATE: 52 BPM | HEIGHT: 66 IN | WEIGHT: 252.6 LBS | TEMPERATURE: 97.3 F | OXYGEN SATURATION: 95 % | DIASTOLIC BLOOD PRESSURE: 72 MMHG | BODY MASS INDEX: 40.6 KG/M2

## 2024-03-01 DIAGNOSIS — Z00.00 MEDICARE ANNUAL WELLNESS VISIT, SUBSEQUENT: ICD-10-CM

## 2024-03-01 DIAGNOSIS — I10 ESSENTIAL (PRIMARY) HYPERTENSION: Primary | ICD-10-CM

## 2024-03-01 DIAGNOSIS — E78.00 PURE HYPERCHOLESTEROLEMIA, UNSPECIFIED: ICD-10-CM

## 2024-03-01 DIAGNOSIS — E03.9 HYPOTHYROIDISM, UNSPECIFIED TYPE: ICD-10-CM

## 2024-03-01 DIAGNOSIS — E66.01 OBESITY, CLASS III, BMI 40-49.9 (MORBID OBESITY) (HCC): ICD-10-CM

## 2024-03-01 DIAGNOSIS — R73.01 IMPAIRED FASTING GLUCOSE: ICD-10-CM

## 2024-03-01 ASSESSMENT — PATIENT HEALTH QUESTIONNAIRE - PHQ9
SUM OF ALL RESPONSES TO PHQ QUESTIONS 1-9: 0
1. LITTLE INTEREST OR PLEASURE IN DOING THINGS: 0
2. FEELING DOWN, DEPRESSED OR HOPELESS: 0
SUM OF ALL RESPONSES TO PHQ9 QUESTIONS 1 & 2: 0
SUM OF ALL RESPONSES TO PHQ QUESTIONS 1-9: 0

## 2024-03-01 ASSESSMENT — LIFESTYLE VARIABLES
HOW MANY STANDARD DRINKS CONTAINING ALCOHOL DO YOU HAVE ON A TYPICAL DAY: 1 OR 2
HOW OFTEN DO YOU HAVE A DRINK CONTAINING ALCOHOL: 2-3 TIMES A WEEK

## 2024-03-01 NOTE — PATIENT INSTRUCTIONS
can you learn more?  Go to https://[email or web address].net/patientEd and enter F075 to learn more about \"A Healthy Heart: Care Instructions.\"  Current as of: June 25, 2023               Content Version: 13.9  © 1087-9521 [employer].   Care instructions adapted under license by [employer]. If you have questions about a medical condition or this instruction, always ask your healthcare professional. [employer] disclaims any warranty or liability for your use of this information.      Personalized Preventive Plan for Ryanne Hooper - 3/1/2024  Medicare offers a range of preventive health benefits. Some of the tests and screenings are paid in full while other may be subject to a deductible, co-insurance, and/or copay.    Some of these benefits include a comprehensive review of your medical history including lifestyle, illnesses that may run in your family, and various assessments and screenings as appropriate.    After reviewing your medical record and screening and assessments performed today your provider may have ordered immunizations, labs, imaging, and/or referrals for you.  A list of these orders (if applicable) as well as your Preventive Care list are included within your After Visit Summary for your review.    Other Preventive Recommendations:    A preventive eye exam performed by an eye specialist is recommended every 1-2 years to screen for glaucoma; cataracts, macular degeneration, and other eye disorders.  A preventive dental visit is recommended every 6 months.  Try to get at least 150 minutes of exercise per week or 10,000 steps per day on a pedometer .  Order or download the FREE \"Exercise & Physical Activity: Your Everyday Guide\" from [employer]. Call 1-854.299.5419 or search [employer] online.  You need 1099-7755 mg of calcium and 0199-8278 IU of vitamin D per day. It is possible to meet your calcium requirement with diet alone, but

## 2024-03-01 NOTE — PROGRESS NOTES
Chief Complaint   Patient presents with    6 Month Follow-Up         \"Have you been to the ER, urgent care clinic since your last visit?  Hospitalized since your last visit?\"    NO    “Have you seen or consulted any other health care providers outside of [facility] since your last visit?”    NO    “Have you had a colorectal cancer screening such as a colonoscopy/FIT/Cologuard?    2012     Have you had a mammogram?”   2020        Health Maintenance Due   Topic Date Due    DEXA (modify frequency per FRAX score)  Never done    Pneumococcal 65+ years Vaccine (2 - PCV) 07/19/2014    Respiratory Syncytial Virus (RSV) Pregnant or age 60 yrs+ (1 - 1-dose 60+ series) Never done    Breast cancer screen  02/05/2022    Colorectal Cancer Screen  11/19/2022    Flu vaccine (1) 08/01/2023    COVID-19 Vaccine (2 - 2023-24 season) 09/01/2023    Annual Wellness Visit (Medicare)  02/23/2024       
TABLET TWICE A DAY    levothyroxine (SYNTHROID) 50 MCG tablet TAKE 1 TABLET DAILY BEFORE BREAKFAST    albuterol sulfate HFA (PROVENTIL;VENTOLIN;PROAIR) 108 (90 Base) MCG/ACT inhaler USE 1 INHALATION EVERY 6 HOURS AS NEEDED FOR WHEEZING    aspirin 81 MG EC tablet Take by mouth daily    vitamin D (CHOLECALCIFEROL) 25 MCG (1000 UT) TABS tablet Take by mouth daily    cyanocobalamin 1000 MCG tablet Take by mouth daily    loratadine (CLARITIN) 10 MG tablet Take by mouth daily     No current facility-administered medications for this visit.     Allergies   Allergen Reactions    Ezetimibe Myalgia    Pravastatin Myalgia       Review of Systems:  Constitutional: Negative for fatigue, malaise  Resp: Negative for cough, wheezing or SOB  CV: Negative for chest pain, dizziness or palpitations  GI: Negative for nausea or abdominal pain  MS: Negative for acute myalgias or arthralgias   Neuro: Negative for HA, weakness or paresthesia  Psych: Negative for depression or anxiety     Vitals:    03/01/24 0911   BP: 115/72   Site: Right Upper Arm   Position: Sitting   Cuff Size: Large Adult   Pulse: 52   Resp: 14   Temp: 97.3 °F (36.3 °C)   TempSrc: Infrared   SpO2: 95%   Weight: 114.6 kg (252 lb 9.6 oz)   Height: 1.676 m (5' 6\")       Physical Examination:  General: Well developed, well nourished, in no acute distress  Head: Normocephalic, atraumatic  Eyes: Sclera clear, EOMI, PERRL  Neck: Normal range of motion  Respiratory: symmetrical, unlabored effort  Cardiovascular: Regular rate and rhythm  Extremities: Full range of motion, normal gait  Neurologic: No focal deficits  Psych: Active, alert and oriented. Affect appropriate      Diagnosis Orders   1. Essential (primary) hypertension  Comprehensive Metabolic Panel    Comprehensive Metabolic Panel      2. Pure hypercholesterolemia, unspecified  Comprehensive Metabolic Panel    Lipid Panel    Lipid Panel    Comprehensive Metabolic Panel      3. Hypothyroidism, unspecified type  TSH

## 2024-03-02 LAB
ALBUMIN SERPL-MCNC: 3.7 G/DL (ref 3.5–5)
ALBUMIN/GLOB SERPL: 1 (ref 1.1–2.2)
ALP SERPL-CCNC: 80 U/L (ref 45–117)
ALT SERPL-CCNC: 42 U/L (ref 12–78)
ANION GAP SERPL CALC-SCNC: 0 MMOL/L (ref 5–15)
AST SERPL-CCNC: 29 U/L (ref 15–37)
BILIRUB SERPL-MCNC: 0.7 MG/DL (ref 0.2–1)
BUN SERPL-MCNC: 8 MG/DL (ref 6–20)
BUN/CREAT SERPL: 13 (ref 12–20)
CALCIUM SERPL-MCNC: 9.6 MG/DL (ref 8.5–10.1)
CHLORIDE SERPL-SCNC: 107 MMOL/L (ref 97–108)
CHOLEST SERPL-MCNC: 168 MG/DL
CO2 SERPL-SCNC: 31 MMOL/L (ref 21–32)
CREAT SERPL-MCNC: 0.63 MG/DL (ref 0.55–1.02)
EST. AVERAGE GLUCOSE BLD GHB EST-MCNC: 131 MG/DL
GLOBULIN SER CALC-MCNC: 3.8 G/DL (ref 2–4)
GLUCOSE SERPL-MCNC: 139 MG/DL (ref 65–100)
HBA1C MFR BLD: 6.2 % (ref 4–5.6)
HDLC SERPL-MCNC: 61 MG/DL
HDLC SERPL: 2.8 (ref 0–5)
LDLC SERPL CALC-MCNC: 88.6 MG/DL (ref 0–100)
POTASSIUM SERPL-SCNC: 4.4 MMOL/L (ref 3.5–5.1)
PROT SERPL-MCNC: 7.5 G/DL (ref 6.4–8.2)
SODIUM SERPL-SCNC: 138 MMOL/L (ref 136–145)
T4 FREE SERPL-MCNC: 1 NG/DL (ref 0.8–1.5)
TRIGL SERPL-MCNC: 92 MG/DL
TSH SERPL DL<=0.05 MIU/L-ACNC: 2.85 UIU/ML (ref 0.36–3.74)
VLDLC SERPL CALC-MCNC: 18.4 MG/DL

## 2024-03-08 RX ORDER — LEVOTHYROXINE SODIUM 0.05 MG/1
TABLET ORAL
Qty: 90 TABLET | Refills: 1 | Status: SHIPPED | OUTPATIENT
Start: 2024-03-08

## 2024-05-15 RX ORDER — AMLODIPINE BESYLATE 10 MG/1
TABLET ORAL
Qty: 90 TABLET | Refills: 1 | Status: SHIPPED | OUTPATIENT
Start: 2024-05-15

## 2024-05-15 RX ORDER — ATENOLOL 25 MG/1
TABLET ORAL
Qty: 180 TABLET | Refills: 1 | Status: SHIPPED | OUTPATIENT
Start: 2024-05-15

## 2024-05-15 RX ORDER — LISINOPRIL 40 MG/1
TABLET ORAL
Qty: 90 TABLET | Refills: 1 | Status: SHIPPED | OUTPATIENT
Start: 2024-05-15

## 2024-06-17 ENCOUNTER — PATIENT MESSAGE (OUTPATIENT)
Facility: CLINIC | Age: 69
End: 2024-06-17

## 2024-06-18 RX ORDER — DOXYCYCLINE HYCLATE 100 MG
100 TABLET ORAL 2 TIMES DAILY
Qty: 28 TABLET | Refills: 0 | Status: SHIPPED | OUTPATIENT
Start: 2024-06-18 | End: 2024-07-02

## 2024-08-16 RX ORDER — ROSUVASTATIN CALCIUM 20 MG/1
TABLET, COATED ORAL
Qty: 90 TABLET | Refills: 1 | Status: SHIPPED | OUTPATIENT
Start: 2024-08-16

## 2024-08-23 ENCOUNTER — COMMUNITY OUTREACH (OUTPATIENT)
Facility: CLINIC | Age: 69
End: 2024-08-23

## 2024-08-23 NOTE — PROGRESS NOTES
Patient's  shows they are overdue for Mammogram, Colonoscopy   CareEverywhere and  files searched  without success.

## 2024-09-03 ENCOUNTER — OFFICE VISIT (OUTPATIENT)
Facility: CLINIC | Age: 69
End: 2024-09-03
Payer: MEDICARE

## 2024-09-03 VITALS
OXYGEN SATURATION: 95 % | DIASTOLIC BLOOD PRESSURE: 82 MMHG | WEIGHT: 251.8 LBS | BODY MASS INDEX: 40.47 KG/M2 | HEIGHT: 66 IN | HEART RATE: 55 BPM | TEMPERATURE: 97.7 F | RESPIRATION RATE: 14 BRPM | SYSTOLIC BLOOD PRESSURE: 131 MMHG

## 2024-09-03 DIAGNOSIS — F17.200 SMOKER: ICD-10-CM

## 2024-09-03 DIAGNOSIS — E78.00 PURE HYPERCHOLESTEROLEMIA, UNSPECIFIED: ICD-10-CM

## 2024-09-03 DIAGNOSIS — I10 ESSENTIAL (PRIMARY) HYPERTENSION: ICD-10-CM

## 2024-09-03 DIAGNOSIS — Z23 ENCOUNTER FOR IMMUNIZATION: Primary | ICD-10-CM

## 2024-09-03 DIAGNOSIS — E03.9 HYPOTHYROIDISM, UNSPECIFIED TYPE: ICD-10-CM

## 2024-09-03 DIAGNOSIS — R73.01 IMPAIRED FASTING GLUCOSE: ICD-10-CM

## 2024-09-03 PROCEDURE — 99214 OFFICE O/P EST MOD 30 MIN: CPT | Performed by: FAMILY MEDICINE

## 2024-09-03 PROCEDURE — 3075F SYST BP GE 130 - 139MM HG: CPT | Performed by: FAMILY MEDICINE

## 2024-09-03 PROCEDURE — 3079F DIAST BP 80-89 MM HG: CPT | Performed by: FAMILY MEDICINE

## 2024-09-03 PROCEDURE — G8417 CALC BMI ABV UP PARAM F/U: HCPCS | Performed by: FAMILY MEDICINE

## 2024-09-03 PROCEDURE — 1090F PRES/ABSN URINE INCON ASSESS: CPT | Performed by: FAMILY MEDICINE

## 2024-09-03 PROCEDURE — 4004F PT TOBACCO SCREEN RCVD TLK: CPT | Performed by: FAMILY MEDICINE

## 2024-09-03 PROCEDURE — G8400 PT W/DXA NO RESULTS DOC: HCPCS | Performed by: FAMILY MEDICINE

## 2024-09-03 PROCEDURE — 3017F COLORECTAL CA SCREEN DOC REV: CPT | Performed by: FAMILY MEDICINE

## 2024-09-03 PROCEDURE — G8427 DOCREV CUR MEDS BY ELIG CLIN: HCPCS | Performed by: FAMILY MEDICINE

## 2024-09-03 PROCEDURE — G0008 ADMIN INFLUENZA VIRUS VAC: HCPCS | Performed by: FAMILY MEDICINE

## 2024-09-03 PROCEDURE — 90653 IIV ADJUVANT VACCINE IM: CPT | Performed by: FAMILY MEDICINE

## 2024-09-03 PROCEDURE — 1123F ACP DISCUSS/DSCN MKR DOCD: CPT | Performed by: FAMILY MEDICINE

## 2024-09-03 RX ORDER — VARENICLINE TARTRATE 0.5 MG/1
.5-1 TABLET, FILM COATED ORAL SEE ADMIN INSTRUCTIONS
Qty: 57 TABLET | Refills: 0 | Status: SHIPPED | OUTPATIENT
Start: 2024-09-03

## 2024-09-03 SDOH — ECONOMIC STABILITY: FOOD INSECURITY: WITHIN THE PAST 12 MONTHS, YOU WORRIED THAT YOUR FOOD WOULD RUN OUT BEFORE YOU GOT MONEY TO BUY MORE.: NEVER TRUE

## 2024-09-03 SDOH — ECONOMIC STABILITY: INCOME INSECURITY: HOW HARD IS IT FOR YOU TO PAY FOR THE VERY BASICS LIKE FOOD, HOUSING, MEDICAL CARE, AND HEATING?: NOT HARD AT ALL

## 2024-09-03 SDOH — ECONOMIC STABILITY: FOOD INSECURITY: WITHIN THE PAST 12 MONTHS, THE FOOD YOU BOUGHT JUST DIDN'T LAST AND YOU DIDN'T HAVE MONEY TO GET MORE.: NEVER TRUE

## 2024-09-03 NOTE — PROGRESS NOTES
Chief Complaint   Patient presents with    Follow-up Chronic Condition      \"Have you been to the ER, urgent care clinic since your last visit?  Hospitalized since your last visit?\"    NO    “Have you seen or consulted any other health care providers outside of [facility] since your last visit?”    NO    Have you had a mammogram?”   NO    Date of last Mammogram: 2/5/2020         “Have you had a colorectal cancer screening such as a colonoscopy/FIT/Cologuard?    NO    Date of last Colonoscopy: 11/19/2012  No cologuard on file  No FIT/FOBT on file   No flexible sigmoidoscopy on file         Click Here for Release of Records Request     Health Maintenance Due   Topic Date Due    DEXA (modify frequency per FRAX score)  Never done    Pneumococcal 65+ years Vaccine (2 of 2 - PCV) 07/19/2014    Respiratory Syncytial Virus (RSV) Pregnant or age 60 yrs+ (1 - 1-dose 60+ series) Never done    Breast cancer screen  02/05/2022    Colorectal Cancer Screen  11/19/2022    Flu vaccine (1) 08/01/2024    COVID-19 Vaccine (2 - 2023-24 season) 09/01/2024

## 2024-09-03 NOTE — PROGRESS NOTES
Progress Note    Patient: Ryanne Hooper MRN: [id number]  SSN: [id number]    YOB: 1955  Age: 69 y.o.  Sex: female        Chief Complaint   Patient presents with    Follow-up Chronic Condition    Spasms     Underneath b/l arms going around to back     she is a 69 y.o. year old female who presents for follow up of chronic health conditions. Patient with hx of HTN, HLD, LANG, obesity and tobacco abuse. She is due for lab work.  Patient denies HA, dizziness, SOB, CP, abdominal pain, dysuria, acute myalgias or arthralgias. She continues to smoke however is wanting to stop.    Encounter Diagnoses   Name Primary?    Encounter for immunization Yes    Essential (primary) hypertension     Pure hypercholesterolemia, unspecified     Hypothyroidism, unspecified type     Impaired fasting glucose     Smoker      BP Readings from Last 3 Encounters:   09/03/24 131/82   03/01/24 115/72   09/01/23 111/72     Wt Readings from Last 3 Encounters:   09/03/24 114.2 kg (251 lb 12.8 oz)   03/01/24 114.6 kg (252 lb 9.6 oz)   09/01/23 112.8 kg (248 lb 9.6 oz)     Body mass index is 40.64 kg/m².    Patient Active Problem List   Diagnosis    Hypertension    Hypercholesterolemia    Hypothyroidism    Dysmetabolic syndrome X    Colon polyps    Irritable bowel syndrome    Anxiety    LANG (nonalcoholic steatohepatitis)    Smoker    Lymphadenitis    Elevated LFTs    Scalp lesion    Elevated blood sugar    Abnormal weight gain     Past Surgical History:   Procedure Laterality Date    CYST REMOVAL      neck    OTHER SURGICAL HISTORY      wisdom teeth extraction    OTHER SURGICAL HISTORY      DNC around 19 years of age     Social History     Socioeconomic History    Marital status:      Spouse name: Not on file    Number of children: Not on file    Years of education: Not on file    Highest education level: Not on file   Occupational History    Not on file   Tobacco Use    Smoking status: Every Day     Current packs/day:

## 2024-09-04 LAB
ALBUMIN SERPL-MCNC: 3.7 G/DL (ref 3.5–5)
ALBUMIN/GLOB SERPL: 1.1 (ref 1.1–2.2)
ALP SERPL-CCNC: 72 U/L (ref 45–117)
ALT SERPL-CCNC: 37 U/L (ref 12–78)
ANION GAP SERPL CALC-SCNC: 5 MMOL/L (ref 5–15)
AST SERPL-CCNC: 32 U/L (ref 15–37)
BILIRUB SERPL-MCNC: 0.6 MG/DL (ref 0.2–1)
BUN SERPL-MCNC: 7 MG/DL (ref 6–20)
BUN/CREAT SERPL: 12 (ref 12–20)
CALCIUM SERPL-MCNC: 9.6 MG/DL (ref 8.5–10.1)
CHLORIDE SERPL-SCNC: 104 MMOL/L (ref 97–108)
CHOLEST SERPL-MCNC: 169 MG/DL
CO2 SERPL-SCNC: 30 MMOL/L (ref 21–32)
CREAT SERPL-MCNC: 0.6 MG/DL (ref 0.55–1.02)
ERYTHROCYTE [DISTWIDTH] IN BLOOD BY AUTOMATED COUNT: 13.6 % (ref 11.5–14.5)
EST. AVERAGE GLUCOSE BLD GHB EST-MCNC: 117 MG/DL
GLOBULIN SER CALC-MCNC: 3.4 G/DL (ref 2–4)
GLUCOSE SERPL-MCNC: 126 MG/DL (ref 65–100)
HBA1C MFR BLD: 5.7 % (ref 4–5.6)
HCT VFR BLD AUTO: 43.2 % (ref 35–47)
HDLC SERPL-MCNC: 65 MG/DL
HDLC SERPL: 2.6 (ref 0–5)
HGB BLD-MCNC: 13.9 G/DL (ref 11.5–16)
LDLC SERPL CALC-MCNC: 83 MG/DL (ref 0–100)
MCH RBC QN AUTO: 32.8 PG (ref 26–34)
MCHC RBC AUTO-ENTMCNC: 32.2 G/DL (ref 30–36.5)
MCV RBC AUTO: 101.9 FL (ref 80–99)
NRBC # BLD: 0 K/UL (ref 0–0.01)
NRBC BLD-RTO: 0 PER 100 WBC
PLATELET # BLD AUTO: 290 K/UL (ref 150–400)
PMV BLD AUTO: 11 FL (ref 8.9–12.9)
POTASSIUM SERPL-SCNC: 4.8 MMOL/L (ref 3.5–5.1)
PROT SERPL-MCNC: 7.1 G/DL (ref 6.4–8.2)
RBC # BLD AUTO: 4.24 M/UL (ref 3.8–5.2)
SODIUM SERPL-SCNC: 139 MMOL/L (ref 136–145)
T4 FREE SERPL-MCNC: 1 NG/DL (ref 0.8–1.5)
TRIGL SERPL-MCNC: 105 MG/DL
TSH SERPL DL<=0.05 MIU/L-ACNC: 2.86 UIU/ML (ref 0.36–3.74)
VLDLC SERPL CALC-MCNC: 21 MG/DL
WBC # BLD AUTO: 5.1 K/UL (ref 3.6–11)

## 2024-09-05 RX ORDER — LEVOTHYROXINE SODIUM 50 UG/1
TABLET ORAL
Qty: 90 TABLET | Refills: 1 | Status: SHIPPED | OUTPATIENT
Start: 2024-09-05

## 2024-11-08 RX ORDER — AMLODIPINE BESYLATE 10 MG/1
TABLET ORAL
Qty: 90 TABLET | Refills: 1 | Status: SHIPPED | OUTPATIENT
Start: 2024-11-08

## 2024-11-08 RX ORDER — ATENOLOL 25 MG/1
TABLET ORAL
Qty: 180 TABLET | Refills: 1 | Status: SHIPPED | OUTPATIENT
Start: 2024-11-08

## 2024-11-08 RX ORDER — LISINOPRIL 40 MG/1
TABLET ORAL
Qty: 90 TABLET | Refills: 1 | Status: SHIPPED | OUTPATIENT
Start: 2024-11-08

## 2025-02-12 RX ORDER — ROSUVASTATIN CALCIUM 20 MG/1
TABLET, COATED ORAL
Qty: 90 TABLET | Refills: 1 | Status: SHIPPED | OUTPATIENT
Start: 2025-02-12

## 2025-03-04 ENCOUNTER — OFFICE VISIT (OUTPATIENT)
Facility: CLINIC | Age: 70
End: 2025-03-04

## 2025-03-04 VITALS
OXYGEN SATURATION: 97 % | HEART RATE: 56 BPM | RESPIRATION RATE: 16 BRPM | WEIGHT: 247 LBS | HEIGHT: 66 IN | DIASTOLIC BLOOD PRESSURE: 62 MMHG | SYSTOLIC BLOOD PRESSURE: 116 MMHG | TEMPERATURE: 97.8 F | BODY MASS INDEX: 39.7 KG/M2

## 2025-03-04 DIAGNOSIS — I10 ESSENTIAL (PRIMARY) HYPERTENSION: ICD-10-CM

## 2025-03-04 DIAGNOSIS — E78.00 PURE HYPERCHOLESTEROLEMIA, UNSPECIFIED: ICD-10-CM

## 2025-03-04 DIAGNOSIS — E03.9 HYPOTHYROIDISM, UNSPECIFIED TYPE: ICD-10-CM

## 2025-03-04 DIAGNOSIS — R73.01 IMPAIRED FASTING GLUCOSE: ICD-10-CM

## 2025-03-04 DIAGNOSIS — Z00.00 MEDICARE ANNUAL WELLNESS VISIT, SUBSEQUENT: ICD-10-CM

## 2025-03-04 DIAGNOSIS — M79.641 RIGHT HAND PAIN: Primary | ICD-10-CM

## 2025-03-04 RX ORDER — MULTIVIT WITH MINERALS/LUTEIN
1000 TABLET ORAL DAILY
COMMUNITY

## 2025-03-04 RX ORDER — DICLOFENAC SODIUM 75 MG/1
75 TABLET, DELAYED RELEASE ORAL 2 TIMES DAILY PRN
Qty: 60 TABLET | Refills: 0 | Status: SHIPPED | OUTPATIENT
Start: 2025-03-04

## 2025-03-04 SDOH — ECONOMIC STABILITY: FOOD INSECURITY: WITHIN THE PAST 12 MONTHS, YOU WORRIED THAT YOUR FOOD WOULD RUN OUT BEFORE YOU GOT MONEY TO BUY MORE.: NEVER TRUE

## 2025-03-04 SDOH — ECONOMIC STABILITY: FOOD INSECURITY: WITHIN THE PAST 12 MONTHS, THE FOOD YOU BOUGHT JUST DIDN'T LAST AND YOU DIDN'T HAVE MONEY TO GET MORE.: NEVER TRUE

## 2025-03-04 ASSESSMENT — PATIENT HEALTH QUESTIONNAIRE - PHQ9
SUM OF ALL RESPONSES TO PHQ QUESTIONS 1-9: 0
1. LITTLE INTEREST OR PLEASURE IN DOING THINGS: NOT AT ALL
SUM OF ALL RESPONSES TO PHQ QUESTIONS 1-9: 0
2. FEELING DOWN, DEPRESSED OR HOPELESS: NOT AT ALL

## 2025-03-04 ASSESSMENT — LIFESTYLE VARIABLES
HOW OFTEN DO YOU HAVE A DRINK CONTAINING ALCOHOL: 2-4 TIMES A MONTH
HOW MANY STANDARD DRINKS CONTAINING ALCOHOL DO YOU HAVE ON A TYPICAL DAY: 1 OR 2

## 2025-03-04 NOTE — PATIENT INSTRUCTIONS
[email or web address] and enter U357 to learn more about \"Starting a Weight-Loss Plan: Care Instructions.\"  Current as of: April 30, 2024  Content Version: 14.3  © 2024 [employer].   Care instructions adapted under license by [employer]. If you have questions about a medical condition or this instruction, always ask your healthcare professional. [employer], [email or web address], disclaims any warranty or liability for your use of this information.         Advance Directives: Care Instructions  Overview  An advance directive is a legal way to state your wishes at the end of your life. It tells your family and your doctor what to do if you can't say what you want.  There are two main types of advance directives. You can change them any time your wishes change.  Living will.  This form tells your family and your doctor your wishes about life support and other treatment. The form is also called a declaration.  Medical power of .  This form lets you name a person to make treatment decisions for you when you can't speak for yourself. This person is called a health care agent (health care proxy, health care surrogate). The form is also called a durable power of  for health care.  If you do not have an advance directive, decisions about your medical care may be made by a family member, or by a doctor or a  who doesn't know you.  It may help to think of an advance directive as a gift to the people who care for you. If you have one, they won't have to make tough decisions by themselves.  For more information, including forms for your state, see the [employer] website (www.c[employer]info.org/planning/advance-directives/).  Follow-up care is a key part of your treatment and safety. Be sure to make and go to all appointments, and call your doctor if you are having problems. It's also a good idea to know your test results and keep a list of the medicines you take.  What should you include in an

## 2025-03-04 NOTE — PROGRESS NOTES
Chief Complaint   Patient presents with    6 Month Follow-Up     Patient right wrist mobility effected, arthritis? Pain and difficulty with mobility.     Medicare AWV        \"Have you been to the ER, urgent care clinic since your last visit?  Hospitalized since your last visit?\"    NO    “Have you seen or consulted any other health care providers outside of [facility] since your last visit?”    Dr haas eye doctor     Have you had a mammogram?”   NO    Date of last Mammogram: 2/5/2020         “Have you had a colorectal cancer screening such as a colonoscopy/FIT/Cologuard?    NO    Date of last Colonoscopy: 11/19/2012  No cologuard on file  No FIT/FOBT on file   No flexible sigmoidoscopy on file         Click Here for Release of Records Request     Health Maintenance Due   Topic Date Due    Shingles vaccine (1 of 2) Never done    DEXA (modify frequency per FRAX score)  Never done    Pneumococcal 50+ years Vaccine (2 of 2 - PCV) 07/19/2014    Respiratory Syncytial Virus (RSV) Pregnant or age 60 yrs+ (1 - Risk 60-74 years 1-dose series) Never done    Breast cancer screen  02/05/2022    Colorectal Cancer Screen  11/19/2022    COVID-19 Vaccine (2 - 2024-25 season) 09/01/2024    DTaP/Tdap/Td vaccine (2 - Td or Tdap) 01/27/2025    Annual Wellness Visit (Medicare)  03/02/2025    Depression Screen  03/01/2025       
(CRESTOR) 20 MG tablet TAKE 1 TABLET NIGHTLY FOR HIGH CHOLESTEROL Yes Chin Carlson MD   atenolol (TENORMIN) 25 MG tablet TAKE 1 TABLET TWICE A DAY Yes Chin Carlson MD   lisinopril (PRINIVIL;ZESTRIL) 40 MG tablet TAKE 1 TABLET DAILY Yes Chin Carlson MD   amLODIPine (NORVASC) 10 MG tablet TAKE 1 TABLET DAILY FOR HIGH BLOOD PRESSURE Yes Chin Carlson MD   levothyroxine (SYNTHROID) 50 MCG tablet TAKE 1 TABLET DAILY BEFORE BREAKFAST Yes Chin Carlson MD   albuterol sulfate HFA (PROVENTIL;VENTOLIN;PROAIR) 108 (90 Base) MCG/ACT inhaler USE 1 INHALATION EVERY 6 HOURS AS NEEDED FOR WHEEZING Yes Automatic Reconciliation, Ar   aspirin 81 MG EC tablet Take by mouth daily Yes Automatic Reconciliation, Ar   vitamin D (CHOLECALCIFEROL) 25 MCG (1000 UT) TABS tablet Take by mouth daily Yes Automatic Reconciliation, Ar   cyanocobalamin 1000 MCG tablet Take by mouth daily Yes Automatic Reconciliation, Ar   varenicline (CHANTIX) 0.5 MG tablet Take 1-2 tablets by mouth See Admin Instructions 0.5mg DAILY for 3 days followed by 0.5mg TWICE DAILY for 4 days followed by 1mg TWICE DAILY  Patient not taking: Reported on 3/4/2025  Chin Carlson MD   loratadine (CLARITIN) 10 MG tablet Take by mouth daily  Patient not taking: Reported on 9/3/2024  Automatic Reconciliation, Ar       CareTeam (Including outside providers/suppliers regularly involved in providing care):   Patient Care Team:  Chin Carlson MD as PCP - General  Chin Carlson MD as PCP - Empaneled Provider  Joe Benitez MD as Physician     Recommendations for Preventive Services Due: see orders and patient instructions/AVS.  Recommended screening schedule for the next 5-10 years is provided to the patient in written form: see Patient Instructions/AVS.     Reviewed and updated this visit:  Tobacco  Allergies  Meds  Problems  Med Hx  Surg Hx  Fam Hx  Sexual   Hx

## 2025-03-05 LAB
ALBUMIN SERPL-MCNC: 3.6 G/DL (ref 3.5–5)
ALBUMIN/GLOB SERPL: 1 (ref 1.1–2.2)
ALP SERPL-CCNC: 68 U/L (ref 45–117)
ALT SERPL-CCNC: 34 U/L (ref 12–78)
ANION GAP SERPL CALC-SCNC: 2 MMOL/L (ref 2–12)
AST SERPL-CCNC: 27 U/L (ref 15–37)
BILIRUB SERPL-MCNC: 0.6 MG/DL (ref 0.2–1)
BUN SERPL-MCNC: 10 MG/DL (ref 6–20)
BUN/CREAT SERPL: 16 (ref 12–20)
CALCIUM SERPL-MCNC: 9.3 MG/DL (ref 8.5–10.1)
CHLORIDE SERPL-SCNC: 106 MMOL/L (ref 97–108)
CHOLEST SERPL-MCNC: 152 MG/DL
CO2 SERPL-SCNC: 28 MMOL/L (ref 21–32)
CREAT SERPL-MCNC: 0.61 MG/DL (ref 0.55–1.02)
ERYTHROCYTE [DISTWIDTH] IN BLOOD BY AUTOMATED COUNT: 13.4 % (ref 11.5–14.5)
EST. AVERAGE GLUCOSE BLD GHB EST-MCNC: 134 MG/DL
GLOBULIN SER CALC-MCNC: 3.5 G/DL (ref 2–4)
GLUCOSE SERPL-MCNC: 138 MG/DL (ref 65–100)
HBA1C MFR BLD: 6.3 % (ref 4–5.6)
HCT VFR BLD AUTO: 42.7 % (ref 35–47)
HDLC SERPL-MCNC: 59 MG/DL
HDLC SERPL: 2.6 (ref 0–5)
HGB BLD-MCNC: 14.2 G/DL (ref 11.5–16)
LDLC SERPL CALC-MCNC: 74.6 MG/DL (ref 0–100)
MCH RBC QN AUTO: 32.8 PG (ref 26–34)
MCHC RBC AUTO-ENTMCNC: 33.3 G/DL (ref 30–36.5)
MCV RBC AUTO: 98.6 FL (ref 80–99)
NRBC # BLD: 0 K/UL (ref 0–0.01)
NRBC BLD-RTO: 0 PER 100 WBC
PLATELET # BLD AUTO: 350 K/UL (ref 150–400)
PMV BLD AUTO: 11.5 FL (ref 8.9–12.9)
POTASSIUM SERPL-SCNC: 4.8 MMOL/L (ref 3.5–5.1)
PROT SERPL-MCNC: 7.1 G/DL (ref 6.4–8.2)
RBC # BLD AUTO: 4.33 M/UL (ref 3.8–5.2)
SODIUM SERPL-SCNC: 136 MMOL/L (ref 136–145)
T4 FREE SERPL-MCNC: 1.1 NG/DL (ref 0.8–1.5)
TRIGL SERPL-MCNC: 92 MG/DL
TSH SERPL DL<=0.05 MIU/L-ACNC: 3.04 UIU/ML (ref 0.36–3.74)
VLDLC SERPL CALC-MCNC: 18.4 MG/DL
WBC # BLD AUTO: 4.8 K/UL (ref 3.6–11)

## 2025-03-05 RX ORDER — LEVOTHYROXINE SODIUM 50 UG/1
TABLET ORAL
Qty: 90 TABLET | Refills: 1 | Status: SHIPPED | OUTPATIENT
Start: 2025-03-05

## 2025-03-11 ENCOUNTER — RESULTS FOLLOW-UP (OUTPATIENT)
Facility: CLINIC | Age: 70
End: 2025-03-11

## 2025-05-06 RX ORDER — LISINOPRIL 40 MG/1
40 TABLET ORAL DAILY
Qty: 90 TABLET | Refills: 1 | Status: SHIPPED | OUTPATIENT
Start: 2025-05-06

## 2025-05-06 RX ORDER — AMLODIPINE BESYLATE 10 MG/1
10 TABLET ORAL DAILY
Qty: 90 TABLET | Refills: 1 | Status: SHIPPED | OUTPATIENT
Start: 2025-05-06

## 2025-05-06 RX ORDER — ATENOLOL 25 MG/1
25 TABLET ORAL 2 TIMES DAILY
Qty: 180 TABLET | Refills: 1 | Status: SHIPPED | OUTPATIENT
Start: 2025-05-06

## (undated) DEVICE — CARTRIDGE CLP LIG HEMLOK GRN --

## (undated) DEVICE — DEVICE TRNSF SPIK STL 2008S] MICROTEK MEDICAL INC]

## (undated) DEVICE — TUBING INSUFLTN 10FT LUER -- CONVERT TO ITEM 368568

## (undated) DEVICE — UNIVERSAL FIXATION CANNULA: Brand: VERSAONE

## (undated) DEVICE — Device

## (undated) DEVICE — SUTURE PDS II SZ 0 L27IN ABSRB VLT L26MM CT-2 1/2 CIR Z334H

## (undated) DEVICE — SPECIMEN RETRIEVAL POUCH: Brand: ENDO CATCH GOLD

## (undated) DEVICE — SUTURE MCRYL SZ 4-0 L27IN ABSRB UD L19MM PS-2 1/2 CIR PRIM Y426H

## (undated) DEVICE — INFECTION CONTROL KIT SYS

## (undated) DEVICE — CLICKLINE SCISSORS INSERT: Brand: CLICKLINE

## (undated) DEVICE — BLADELESS OPTICAL TROCAR WITH FIXATION CANNULA: Brand: VERSAPORT

## (undated) DEVICE — E-Z CLEAN, PTFE COATED, ELECTROSURGICAL LAPAROSCOPIC ELECTRODE, L-HOOK, 33 CM., SINGLE-USE, FOR USE WITH HAND CONTROL PENCIL: Brand: MEGADYNE

## (undated) DEVICE — STERILE POLYISOPRENE POWDER-FREE SURGICAL GLOVES: Brand: PROTEXIS

## (undated) DEVICE — DERMABOND SKIN ADH 0.7ML -- DERMABOND ADVANCED 12/BX

## (undated) DEVICE — COVER LT HNDL BLU PLAS

## (undated) DEVICE — (D)PREP SKN CHLRAPRP APPL 26ML -- CONVERT TO ITEM 371833

## (undated) DEVICE — DEVON™ KNEE AND BODY STRAP 60" X 3" (1.5 M X 7.6 CM): Brand: DEVON

## (undated) DEVICE — NEEDLE HYPO 22GA L1.5IN BLK S STL HUB POLYPR SHLD REG BVL

## (undated) DEVICE — SOL IRRIGATION INJ NACL 0.9% 500ML BTL

## (undated) DEVICE — BLUNT TIP TROCAR: Brand: AUTO SUTURE

## (undated) DEVICE — REM POLYHESIVE ADULT PATIENT RETURN ELECTRODE: Brand: VALLEYLAB

## (undated) DEVICE — SURGICAL PROCEDURE KIT GEN LAPAROSCOPY LF

## (undated) DEVICE — 3000CC GUARDIAN II: Brand: GUARDIAN

## (undated) DEVICE — KENDALL SCD EXPRESS SLEEVES, KNEE LENGTH, MEDIUM: Brand: KENDALL SCD

## (undated) DEVICE — STERILE POLYISOPRENE POWDER-FREE SURGICAL GLOVES WITH EMOLLIENT COATING: Brand: PROTEXIS